# Patient Record
Sex: FEMALE | Race: WHITE | Employment: PART TIME | URBAN - METROPOLITAN AREA
[De-identification: names, ages, dates, MRNs, and addresses within clinical notes are randomized per-mention and may not be internally consistent; named-entity substitution may affect disease eponyms.]

---

## 2018-02-20 ENCOUNTER — PRE VISIT (OUTPATIENT)
Dept: MATERNAL FETAL MEDICINE | Facility: CLINIC | Age: 43
End: 2018-02-20

## 2018-02-21 ENCOUNTER — HOSPITAL ENCOUNTER (OUTPATIENT)
Dept: CARDIOLOGY | Facility: CLINIC | Age: 43
Discharge: HOME | End: 2018-02-21
Admitting: FAMILY MEDICINE
Payer: COMMERCIAL

## 2018-02-21 DIAGNOSIS — O26.90 PREGNANCY RELATED CONDITION, ANTEPARTUM: ICD-10-CM

## 2018-02-21 PROCEDURE — 93325 DOPPLER ECHO COLOR FLOW MAPG: CPT

## 2018-04-24 ENCOUNTER — HOSPITAL ENCOUNTER (OUTPATIENT)
Facility: CLINIC | Age: 43
Discharge: HOME | End: 2018-04-25
Attending: OBSTETRICS & GYNECOLOGY | Admitting: OBSTETRICS & GYNECOLOGY
Payer: COMMERCIAL

## 2018-04-24 ENCOUNTER — TRANSFERRED RECORDS (OUTPATIENT)
Dept: HEALTH INFORMATION MANAGEMENT | Facility: CLINIC | Age: 43
End: 2018-04-24

## 2018-04-24 PROBLEM — Z36.89 ENCOUNTER FOR TRIAGE IN PREGNANT PATIENT: Status: ACTIVE | Noted: 2018-04-24

## 2018-04-24 LAB
ABO + RH BLD: NORMAL
ABO + RH BLD: NORMAL
ALBUMIN UR-MCNC: NEGATIVE MG/DL
AMPHETAMINES UR QL SCN: NEGATIVE
APPEARANCE UR: CLEAR
BACTERIA #/AREA URNS HPF: ABNORMAL /HPF
BILIRUB UR QL STRIP: NEGATIVE
BLD GP AB SCN SERPL QL: NORMAL
BLOOD BANK CMNT PATIENT-IMP: NORMAL
CANNABINOIDS UR QL: NEGATIVE
COCAINE UR QL: NEGATIVE
COLOR UR AUTO: ABNORMAL
ERYTHROCYTE [DISTWIDTH] IN BLOOD BY AUTOMATED COUNT: 13 % (ref 10–15)
GLUCOSE UR STRIP-MCNC: NEGATIVE MG/DL
HCT VFR BLD AUTO: 32.9 % (ref 35–47)
HGB BLD-MCNC: 10.4 G/DL (ref 11.7–15.7)
HGB UR QL STRIP: ABNORMAL
KETONES UR STRIP-MCNC: 10 MG/DL
LEUKOCYTE ESTERASE UR QL STRIP: ABNORMAL
MCH RBC QN AUTO: 26.7 PG (ref 26.5–33)
MCHC RBC AUTO-ENTMCNC: 31.6 G/DL (ref 31.5–36.5)
MCV RBC AUTO: 85 FL (ref 78–100)
MUCOUS THREADS #/AREA URNS LPF: PRESENT /LPF
NITRATE UR QL: NEGATIVE
OPIATES UR QL SCN: NEGATIVE
PCP UR QL SCN: NEGATIVE
PH UR STRIP: 7.5 PH (ref 5–7)
PLATELET # BLD AUTO: 201 10E9/L (ref 150–450)
RBC # BLD AUTO: 3.89 10E12/L (ref 3.8–5.2)
RBC #/AREA URNS AUTO: 16 /HPF (ref 0–2)
SOURCE: ABNORMAL
SP GR UR STRIP: 1 (ref 1–1.03)
SPECIMEN EXP DATE BLD: NORMAL
SPECIMEN SOURCE: NORMAL
SQUAMOUS #/AREA URNS AUTO: 2 /HPF (ref 0–1)
UROBILINOGEN UR STRIP-MCNC: NORMAL MG/DL (ref 0–2)
WBC # BLD AUTO: 14 10E9/L (ref 4–11)
WBC #/AREA URNS AUTO: 5 /HPF (ref 0–5)
WET PREP SPEC: NORMAL

## 2018-04-24 PROCEDURE — 86900 BLOOD TYPING SEROLOGIC ABO: CPT | Performed by: OBSTETRICS & GYNECOLOGY

## 2018-04-24 PROCEDURE — 87086 URINE CULTURE/COLONY COUNT: CPT | Performed by: OBSTETRICS & GYNECOLOGY

## 2018-04-24 PROCEDURE — 81001 URINALYSIS AUTO W/SCOPE: CPT | Performed by: OBSTETRICS & GYNECOLOGY

## 2018-04-24 PROCEDURE — 86901 BLOOD TYPING SEROLOGIC RH(D): CPT | Performed by: OBSTETRICS & GYNECOLOGY

## 2018-04-24 PROCEDURE — 36415 COLL VENOUS BLD VENIPUNCTURE: CPT | Performed by: OBSTETRICS & GYNECOLOGY

## 2018-04-24 PROCEDURE — 87210 SMEAR WET MOUNT SALINE/INK: CPT | Performed by: OBSTETRICS & GYNECOLOGY

## 2018-04-24 PROCEDURE — 87491 CHLMYD TRACH DNA AMP PROBE: CPT | Performed by: OBSTETRICS & GYNECOLOGY

## 2018-04-24 PROCEDURE — 80307 DRUG TEST PRSMV CHEM ANLYZR: CPT | Performed by: OBSTETRICS & GYNECOLOGY

## 2018-04-24 PROCEDURE — 87591 N.GONORRHOEAE DNA AMP PROB: CPT | Performed by: OBSTETRICS & GYNECOLOGY

## 2018-04-24 PROCEDURE — 85027 COMPLETE CBC AUTOMATED: CPT | Performed by: OBSTETRICS & GYNECOLOGY

## 2018-04-24 PROCEDURE — 86850 RBC ANTIBODY SCREEN: CPT | Performed by: OBSTETRICS & GYNECOLOGY

## 2018-04-24 RX ORDER — PRENATAL VIT/IRON FUM/FOLIC AC 27MG-0.8MG
1 TABLET ORAL DAILY
Status: DISCONTINUED | OUTPATIENT
Start: 2018-04-24 | End: 2018-04-25 | Stop reason: HOSPADM

## 2018-04-24 RX ORDER — ONDANSETRON 2 MG/ML
4 INJECTION INTRAMUSCULAR; INTRAVENOUS EVERY 6 HOURS PRN
Status: DISCONTINUED | OUTPATIENT
Start: 2018-04-24 | End: 2018-04-25 | Stop reason: HOSPADM

## 2018-04-24 RX ORDER — SIMETHICONE 80 MG
160 TABLET,CHEWABLE ORAL EVERY 4 HOURS PRN
Status: DISCONTINUED | OUTPATIENT
Start: 2018-04-24 | End: 2018-04-25 | Stop reason: HOSPADM

## 2018-04-24 RX ORDER — PRENATAL VIT/IRON FUM/FOLIC AC 27MG-0.8MG
1 TABLET ORAL DAILY
COMMUNITY

## 2018-04-24 RX ORDER — AMOXICILLIN 250 MG
1 CAPSULE ORAL 2 TIMES DAILY
Status: DISCONTINUED | OUTPATIENT
Start: 2018-04-24 | End: 2018-04-25 | Stop reason: HOSPADM

## 2018-04-24 RX ORDER — BETAMETHASONE SODIUM PHOSPHATE AND BETAMETHASONE ACETATE 3; 3 MG/ML; MG/ML
12 INJECTION, SUSPENSION INTRA-ARTICULAR; INTRALESIONAL; INTRAMUSCULAR; SOFT TISSUE EVERY 24 HOURS
Status: COMPLETED | OUTPATIENT
Start: 2018-04-25 | End: 2018-04-25

## 2018-04-24 RX ORDER — AMOXICILLIN 250 MG
2 CAPSULE ORAL 2 TIMES DAILY
Status: DISCONTINUED | OUTPATIENT
Start: 2018-04-24 | End: 2018-04-25 | Stop reason: HOSPADM

## 2018-04-24 NOTE — PLAN OF CARE
Data: Patient admitted to room triage 1. Patient is a . Prenatal record reviewed.   Obstetric History       T5      L5     SAB0   TAB0   Ectopic0   Multiple0   Live Births5       # Outcome Date GA Lbr Emmanuel/2nd Weight Sex Delivery Anes PTL Lv   6 Current            5 Term         AYSE   4 Term         AYSE   3 Term         AYSE   2 Term         AYSE   1 Term         AYSE      Transferred from [facility] for rule out ROM and PTL eval. Negative amisure at [facility]. There she received Betamethasone , terbutaline and IV hydration. Transverse lie per US there also. States that she has a small amount of dark brown vaginal discharge. Is having contractions but not feeling them. FHT's 140's with minimal variability. VSS. Up all night and states is very tired.  Medical History:   Past Medical History:   Diagnosis Date     ASCUS favoring benign 4/10/15    neg HPV     Gestational age 34w2d. Vital signs per doc flowsheet. Fetal movement present. Patient reports No chief complaint on file.   as reason for admission. Support persons Daren and 2year old daughterpresent.  Action: Verbal consent for EFM, external fetal monitors applied. Admission assessment completed. Patient instructed to report change in fetal movement, contractions, vaginal leaking of fluid or bleeding, abdominal pain, or any concerns related to the pregnancy to her nurse/physician. Patient oriented to room, call light in reach.   Response: Dr. Denson informed of arrival. Plan per provider is EFM and US.

## 2018-04-24 NOTE — IP AVS SNAPSHOT
[facility]    [address]    [place] [address]    Phone:  [phone]                                       After Visit Summary   4/24/2018    Echo Lea    MRN: [id number]           After Visit Summary Signature Page     I have received my discharge instructions, and my questions have been answered. I have discussed any challenges I see with this plan with the nurse or doctor.    ..........................................................................................................................................  Patient/Patient Representative Signature      ..........................................................................................................................................  Patient Representative Print Name and Relationship to Patient    ..................................................               ................................................  Date                                            Time    ..........................................................................................................................................  Reviewed by Signature/Title    ...................................................              ..............................................  Date                                                            Time

## 2018-04-24 NOTE — H&P
"[facility] Labor and Delivery History and Physical    Echo Lea   MRN# [id number]     43 year old   YOB: 1975       Date of Admission:  2018     Primary care provider: John Cobb    CC: vaginal bleeding    HPI: Echo Lea  is a 43 year old  at 34w2d by 22w2d US who presents via transfer from [place] for evaluation of vaginal bleeding. She notes good fetal movement. The patient notes she woke up around 0300 this morning and went to the bathroom where she noticed a small amount of blood in the toilet and on the toilet paper, thought it may have been her \"mucus plug\". She states the blood wasn't bright and looked like, \"when you cut your finger.\" She reports she went back to bed and that when she laid down she thought she felt leaking fluid. The patient reports she went to the bathroom and again noted bright red spotting and a corrie-sized clot in the toilet, at which time she went to the hospital. She said she was initially having contractions every 3 minutes that, \"felt like bearing down,\" but were not painful. Ms. Lea states she has been feeling something similar intermittently over the past month. After receiving shots at [place] she notes these episodes decreased and she now feels like she is not having contractions but this feeling of bearing down and tightness across her lower abdomen. Has felt this is related to FM in the past. The patient notes that she had intercourse last night. She states she has had an uncomplicated pregnancy but notes restless legs and back pain that has worsened during the pregnancy.     Echo's pregnancy is complicated by suspected fetal Trisomy 21 by ultrasound findings (multiple VSD, thick nuchal fold, sandal gap), as well as Innatal positive for Trisomy 21.    Ms. Lea notes some urgency and frequency, but denies dysuria, HA, vision changes, chest pain, SOB, or swelling in her extremities.     Pregnancy " "complicated by:  1. AMA  2. Trisomy 21 of fetus, based on US markers, with VSD x3, positive NIPT  3. Polyhydramnios (PHYLLIS 28) diagnosed on 4/10 US  4. Hx of GDM, diet controlled    ROS:   Complete 10-point ROS negative except as noted in HPI.     Pregnancy History:  OBSTETRIC HISTORY:    Patient notes history of two early term miscarriages and that she has had successful pregnancies since that time. She reports normal vaginal deliveries at term for her other 5 children.     Prenatal Labs (18):  Blood type: O   Rh: positive  Antibody screen: negative  Hgb: 13.2 g/dL  Plt: 265  HIV: negative  HBsAg: negative  CBC: HGB, Plt  Rubella: Immune  VDRL: negative  GC/Chlam: No results available  GCT: No results available    GBS Status:   Results from [place] pending    Medication Prior to Admission  PNV     Medical History  ASCUS pap, neg HPV    Surgical History  Denies    Family History  No family hx of bleeding or clotting disorders    Social History:  Lives with partner and daugter. Denies tobacco, ETOH or drug use during pregnancy.    Physical Exam:  /58  Temp 98.2  F (36.8  C)  Resp 16  Ht 1.651 m (5' 5\")  Wt 69.4 kg (153 lb)  LMP 2017  BMI 25.46 kg/m2     Gen: Appears tired but in no apparent distress.  CV: RRR, no murmurs  Pulm: CTAB, no wheezes   GI: gravid, soft, nontender.   : golf ball-sized dark red clot in the vaginal vault with small amount of dark liquid blood.   Cervix: soft, multiparous    Presentation:Transverse, back up by US at OSH    Fetal Heart Rate Tracing: Baseline 140s, moderate variability, accels present, no decels  Tocometer: 1-2 contractions in 10 minutes    US: 4/10/18: Impression  =========    1) Intrauterine pregnancy at 32 3/7 weeks gestational age.  2) Known VSDs, however not visualized today.  3) The cisterna magna and CNS appeared normal today.  4) None of the other anomalies commonly detected by ultrasound were evident in the fetal anatomic survey " described above.  5) Growth parameters and estimated fetal weight were consistent with an appropriate for gestation age pattern of growth.  6) New onset polyhydramnios.  7) Reassuring BPP.    Assessment:   Echo Lea  is a 43 year old  at 34w2d by 22w2d US who presents via transfer from [place] for evaluation of vaginal bleeding. Patient's pregnancy significant for AMA, suspected trisomy 21 of fetus with VSD x3, polyhydramnios, and hx of GDM.  On exam a small to moderate appearing amount of dark red blood in vault. Exam overall reassuring with cervix /-2 soft, MP. Patient in NAD and FHT Cat 1, reactive.  Source of bleeding unclear, did have intercourse last night but more bleeding than typically seen with cervical friability. No identified trauma that would increase risk for abruption. Contractions present, so possible that cervical change with  labor is the source of vaginal bleeding. We will plan to admit overnight for observation and fetal monitoring to rule out abruption.     Plan:  #Vaginal bleeding  - Source unclear. Recent intercourse but more bleeding than typical for this. Plan to admit to to observation to help rule out abruption.   - Less concerned for  labor given unchanged on cervical exam, will continue to monitor contractions and recheck PRN  - GC/Chlam and wet prep pending to rule out cervicitis   - UDS pending  - Plan for BPP and evaluation of placenta and fetal growth tomorrow  - S/p BMZ this morning prior to transfer, will repeat in AM  - CBC and T&S pending  - No tocolysis given gestational age beyond 34 weeks    #Polyhydramnios  - Weekly BPP (plan to perform tomorrow am)    #FWB  - Cat I reactive  - T21 with known VSD, plan for fetal echo immediately PP  - Continue to monitor closely for signs of abrutpion    #PNC  - GBS pending from OSH  - Transverse back up at OSH, plan for CS if laboring, but will reconfirm fetal position prior to delivery.     # Pain  Assessment:  Current Pain Score 4/24/2018   Patient currently in pain? yes   Pain location Back   Pain descriptors Aching   - Echo is experiencing pain due to pregnancy. Pain management was discussed and the plan was created in a collaborative fashion.  Echo's response to the current recommendations: engaged  Plan for tylenol PRN    Jazzmine Marquez MD PGY3  OB/GYN Resident  4/24/2018 11:44 AM

## 2018-04-24 NOTE — IP AVS SNAPSHOT
MRN:[id number]                      After Visit Summary   4/24/2018    Echo Lea    MRN: [id number]           Thank you!     Thank you for choosing [facility] for your care. Our goal is always to provide you with excellent care. Hearing back from our patients is one way we can continue to improve our services. Please take a few minutes to complete the written survey that you may receive in the mail after you visit with us. Thank you!        Patient Information     Date Of Birth          1975        Designated Caregiver       Most Recent Value    Caregiver    Will someone help with your care after discharge? no      About your hospital stay     You were admitted on:  April 24, 2018 You last received care in the:  [facility]    You were discharged on:  April 25, 2018        Reason for your hospital stay       Rule out labor, abruption                  Who to Call     For medical emergencies, please call 911.  For non-urgent questions about your medical care, please call your primary care provider or clinic, [phone]          Attending Provider     Provider Specialty    Carina Denson MD OB/Gyn       Primary Care Provider Office Phone # Fax #    John Cobb [phone]       When to contact your care team       Call increasing contractions, vaginal bleeding, decreased fetal movement, leaking fluid, headache, visoin changes, chest pain, shortness of breath                  After Care Instructions     Activity       Your activity upon discharge: as tolerated, no intercourse            Diet       Follow this diet upon discharge: regular                  Follow-up Appointments     Follow Up and recommended labs and tests       Follow up within one week for OB visit. Begin twice weekly BPPs.                  Further instructions from your care team       Discharge Instruction for Undelivered Patients      You were seen for: Labor Assessment and Bleeding Assessment  We  "Consulted: Maternal Fetal Medicine, Dr Denson  You had (Test or Medicine):BPP     Diet:   Drink 8 to 12 glasses of liquids (milk, juice, water) every day.  You may eat meals and snacks.     Activity:  Rest the pelvic area. No sex. Do not stimulate breasts or nipples.  Count fetal kicks everyday (see handout)  Call your doctor or nurse midwife if your baby is moving less than usual.     Call your provider if you notice:  Swelling in your face or increased swelling in your hands or legs.  Headaches that are not relieved by Tylenol (acetaminophen).  Changes in your vision (blurring: seeing spots or stars.)  Nausea (sick to your stomach) and vomiting (throwing up).   Weight gain of 5 pounds or more per week.  Heartburn that doesn't go away.  Signs of bladder infection: pain when you urinate (use the toilet), need to go more often and more urgently.  The bag of velez (rupture of membranes) breaks, or you notice leaking in your underwear.  Bright red blood in your underwear.  Abdominal (lower belly) or stomach pain.  Second (plus) baby: Contractions (tightening) less than 10 minutes apart and getting stronger.  If less than 34 weeks: Contractions (tightenings) more than 6 times in one hour.  Increase or change in vaginal discharge (note the color and amount)      Follow-up:  As scheduled in the clinic          Pending Results     Date and Time Order Name Status Description    4/25/2018 0007 Maternal Fetal BPP Single In process     4/24/2018 1027 Chlamydia trachomatis PCR In process     4/24/2018 1027 Neisseria gonorrhoea PCR In process     4/24/2018 0900 Urine Culture Aerobic Bacterial Preliminary             Admission Information     Date & Time Provider Department Dept. Phone    4/24/2018 Carina Denson MD [facility] [phone]      Your Vitals Were     Blood Pressure Temperature Respirations Height Weight Last Period    90/53 98.3  F (36.8  C) (Oral) 18 1.651 m (5' 5\") 69.4 kg (153 lb) 09/29/2017    BMI (Body " "Mass Index)                   25.46 kg/m2           imagine Information     imagine lets you send messages to your doctor, view your test results, renew your prescriptions, schedule appointments and more. To sign up, go to [email or web address] . Click on \"Log in\" on the left side of the screen, which will take you to the Welcome page. Then click on \"Sign up Now\" on the right side of the page.     You will be asked to enter the access code listed below, as well as some personal information. Please follow the directions to create your username and password.     Your access code is: [email or web address]  Expires: 2018 10:21 AM     Your access code will  in 90 days. If you need help or a new code, please call your [place] clinic or [phone].        Care EveryWhere ID     This is your Care EveryWhere ID. This could be used by other organizations to access your [place] medical records  [id number]        Equal Access to Services     ROBERT STROUD : Hadii sd sykes hadasho Sojoseali, waaxda luqadaha, qaybta kaalmada adeegyada, alfonso hartmann . So [facility] [phone].    ATENCIÓN: Si habla español, tiene a garcia disposición servicios gratuitos de asistencia lingüística. Sonido al [phone].    We comply with applicable federal civil rights laws and [place] laws. We do not discriminate on the basis of race, color, national origin, age, disability, sex, sexual orientation, or gender identity.               Review of your medicines      CONTINUE these medicines which have NOT CHANGED        Dose / Directions    prenatal multivitamin plus iron 27-0.8 MG Tabs per tablet        Dose:  1 tablet   Take 1 tablet by mouth daily   Refills:  0                Protect others around you: Learn how to safely use, store and throw away your medicines at www.disposemymeds.org.             Medication List: This is a list of all your medications and when to take them. Check marks below indicate your daily home " schedule. Keep this list as a reference.      Medications           Morning Afternoon Evening Bedtime As Needed    prenatal multivitamin plus iron 27-0.8 MG Tabs per tablet   Take 1 tablet by mouth daily                                          More Information        Kick Counts    It s normal to worry about your baby s health. One way you can know your baby s doing well is to record the baby s movements once a day. This is called a kick count. Remember to take your kick count records to all your appointments with your healthcare provider.  How to count kicks  Here are tips for counting kicks:    Choose a time when the baby is active, such as after a meal.     Sit comfortably or lie on your side.     The first time the baby moves, write down the time.     Count each movement until the baby has moved 10 times. This can take from 20 minutes to 2 hours.     Try to do it at the same time each day.  When to call your healthcare provider  Call your healthcare provider right away if you notice any of the following:    Your baby moves fewer than 10 times in 2 hours while you re doing kick counts.    Your baby moves much less often than on the days before.    You have not felt your baby move all day.  Date Last Reviewed: 12/1/2017 2000-2017 [employer]. [address] [place] [address], [place] [address]. All rights reserved. This information is not intended as a substitute for professional medical care. Always follow your healthcare professional's instructions.

## 2018-04-24 NOTE — PLAN OF CARE
"Problem: Bleeding, Second or Third Trimester (Adult,Obstetrics,Pediatric)  Goal: Signs and Symptoms of Listed Potential Problems Will be Absent, Minimized or Managed (Bleeding, Second or Third Trimester)  Signs and symptoms of listed potential problems will be absent, minimized or managed by discharge/transition of care (reference Bleeding, Second or Third Trimester (Adult,Obstetrics,Pediatric) CPG).   Outcome: No Change  Pt transferred to 420, settled and oriented to room. EFM and toco reapplied. MD Denson to bedside to discuss plan of care with pt.     VSS. Reports irregular contractions, scant \"pinkish-red\" bleeding, denies LOF. Continue to monitor per protocol.       "

## 2018-04-24 NOTE — DISCHARGE SUMMARY
"[facility] Discharge Summary    Echo Lea MRN# [id number]   Age: 43 year old YOB: 1975     Date of Admission:  2018  Date of Discharge::  2018  Admitting Physician:  Carina Denson MD  Discharge Physician:             Carina Denson MD       Admission Diagnosis:  IUP at 34w2d  AMA  SuspectedTrisomy 21 of fetus (positive NIPT with sonographic markers)  Fetal VSD x3  Polyhydramnios  Hx of GDM, diet controlled   contractions with bleeding  Grand multiparous    Discharge Diagnosis:  IUP at 34w3d  AMA  Suspected Trisomy of Fetus (positive NIPT with sonographic markers)  Fetal VSD x3  Severe Polyhydramnios  Hx of GDM, diet controlled   contractions with bleeding  Grand multiparous    Procedures:   BPP    Consultations:   None    Medications prior to admission:  PNV    Brief History of Presentation:   Echo Lea  is a 43 year old  at 34w2d by 22w2d US who presents via transfer from [place] for evaluation of vaginal bleeding. She notes good fetal movement. The patient notes she woke up around 0300 this morning and went to the bathroom where she noticed a small amount of blood in the toilet and on the toilet paper, thought it may have been her \"mucus plug\". She states the blood wasn't bright and looked like, \"when you cut your finger.\" She reports she went back to bed and that when she laid down she thought she felt leaking fluid. The patient reports she went to the bathroom and again noted bright red spotting and a corrie-sized clot in the toilet, at which time she went to the hospital. She said she was initially having contractions every 3 minutes that, \"felt like bearing down,\" but were not painful. Ms. Lea states she has been feeling something similar intermittently over the past month. After receiving shots at [place] she notes these episodes decreased and she now feels like she is not having contractions but this feeling of bearing down " and tightness across her lower abdomen. Has felt this is related to FM in the past. The patient notes that she had intercourse last night. She states she has had an uncomplicated pregnancy but notes restless legs and back pain that has worsened during the pregnancy.      Echo's pregnancy is complicated by suspected fetal Trisomy 21 by ultrasound findings (multiple VSD, thick nuchal fold, sandal gap), as well as Innatal positive for Trisomy 21.     Ms. Lea notes some urgency and frequency, but denies dysuria, HA, vision changes, chest pain, SOB, or swelling in her extremities    Hospital Course:    Patient was admitted to antepartum for continued observation and evaluation in the setting of vaginal bleeding with small to moderate amount of dark red blood visualized on speculum exam. She underwent fetal monitoring which was reactive and reassuring without evidence of fetal distress. Infectious work-up was negative with pending GC/Chlam at time of discharge. She was given one dose of BMZ prior to transfer and received her second on HD#2. Patient had been ani regularly on the monitor, however they remained non-painful and did not change in frequency. Cervical exam was done prior to discharge without signs of cervical change or concern for  labor. Abruption could not definitively be ruled out, however bleeding resolved spontaneously without recurrence overnight. Patient was counseled on preference for one additional day of observation, however patient had strong desire to leave and was unable to remain inpatient due to childcare concerns. She was clinically stable at discharge and repeat cervical exam unchanged on day of discharge. Of note prior to discharge she had BPP 10/10 but with increase in polyhydramnios to 40.2. Discussed indications for return and low threshold to return given distance from her home.     Discharge Instructions:  Call or present to labor and delivery if you  experience:   -Regular painful contractions concerning for labor   -Leakage of fluid concerning for ruptured membranes   -Decreased fetal movement   -Bright red vaginal bleeding    -Headache, vision changes, upper abdominal pain, significant increase in swelling,   generalized unwell feeling    Follow up:  Follow up in [facility] clinic on 5/2/2018 at [place] as previously scheduled  Follow up with Kellie Ob/Gyn MD within one week of hospital discharge  Continue weekly BPPs given polyhydramnios    Discharge Medications:  PNV    Jazzmine Marquez PGY3  4/25/2018 11:37 AM     Physician Attestation   I, Carina Denson, saw and evaluated this patient prior to discharge.  I discussed the patient with the resident and agree with plan of care as documented in the resident note.      I personally reviewed vital signs, medications, labs and imaging.    I personally spent 30 minutes on discharge activities.    Carina Denson  Date of Service (when I saw the patient): 04/25/18

## 2018-04-25 ENCOUNTER — HOSPITAL ENCOUNTER (OUTPATIENT)
Dept: ULTRASOUND IMAGING | Facility: CLINIC | Age: 43
End: 2018-04-25
Attending: OBSTETRICS & GYNECOLOGY
Payer: COMMERCIAL

## 2018-04-25 VITALS
TEMPERATURE: 98.3 F | HEIGHT: 65 IN | BODY MASS INDEX: 25.49 KG/M2 | WEIGHT: 153 LBS | RESPIRATION RATE: 18 BRPM | SYSTOLIC BLOOD PRESSURE: 90 MMHG | DIASTOLIC BLOOD PRESSURE: 53 MMHG

## 2018-04-25 LAB
BACTERIA SPEC CULT: NORMAL
Lab: NORMAL
SPECIMEN SOURCE: NORMAL

## 2018-04-25 PROCEDURE — 25000128 H RX IP 250 OP 636: Performed by: OBSTETRICS & GYNECOLOGY

## 2018-04-25 PROCEDURE — 76819 FETAL BIOPHYS PROFIL W/O NST: CPT

## 2018-04-25 RX ADMIN — BETAMETHASONE SODIUM PHOSPHATE AND BETAMETHASONE ACETATE 12 MG: 3; 3 INJECTION, SUSPENSION INTRA-ARTICULAR; INTRALESIONAL; INTRAMUSCULAR at 05:54

## 2018-04-25 NOTE — PLAN OF CARE
"Problem: Patient Care Overview  Goal: Plan of Care/Patient Progress Review  Outcome: Improving  Pt stable, VS WDL. Pt c/o lower back pain, using ice pack for pain relief. Pt c/o headache in AM, declined interventions. Pt denies leaking of fluid; states \"small blood clot\" this AM but otherwise no bleeding. 2nd dose of betamethasone given at 0555. FHR normal baseline, moderate variabilitly, accels present, no decels. Fabio q 4-10 min, pt denies feeling contractions, states feeling sharp back pains when sitting in bed, finds relief laying on side.      "

## 2018-04-25 NOTE — PROGRESS NOTES
"OB ANTEPARTUM PROGRESS NOTE      Subjective: Patient doing okay. Strongly desires discharge to home. No increase in contractions overnight, just describes an intermittent \"bearing down.\" Complains of significant back pain. Small dark red clot this morning, otherwise no further bleeding. Feeling baby move.     Objective:  BP 90/53  Temp 98.3  F (36.8  C) (Oral)  Resp 18  Ht 1.651 m (5' 5\")  Wt 69.4 kg (153 lb)  LMP 2017  BMI 25.46 kg/m2   Gen: Lying in bed, NAD  Heart/Lung: no increased work of breathing, well perfused  Abd: soft, non tender  Ext: trace edema, non tender  SVE: /-2    Imaging:  BPP: PHYLLIS 40.2, 10/10 points scored  FHT: 140 baseline, moderate variability, + accels, no decels  [place]: 1-2 ctx/10 min    Assessment and Plan:  Echo Lea  is a 43 year old  at 34w3d by 22w2d US who was admitted yesterday after transfer from [place] in the setting of larger than expected vaginal bleeding and contractions. Patient's pregnancy significant for AMA, suspected trisomy 21 of fetus with VSD x3, polyhydramnios, and hx of GDM.  On exam a small to moderate appearing amount of dark red blood in vault on admission. Patient has been stable overnight with no increasing contractions and no further vaginal bleeding. SVE this morning is unchanged and patient strongly desires discharge to home (unable to stay due to childcare constraints).      Plan:  #Vaginal bleeding  - Source unclear. Recent intercourse but more bleeding than typical for this. has been admitted for further evaluation and has had no further active bleeding.   - Less concerned for  labor given unchanged on cervical exam again this morning   - GC/Chlam pending to rule out cervicitis. Wet prep WNL.   - UDS neg  - BPP 10/10 this morning  - s/p BMZ x2  - HGB stable, no ongoing findings to suggest abruption. Did discuss recommendation for further monitoring overnight -- patient declines 2/2  issues.  - No tocolysis " given gestational age beyond 34 weeks     #Polyhydramnios  - Worsened this morning to 40.2  - Discussed recommendation to continue weekly BPPs  - Consider delivery at 37 weeks if persistent severe polyhdyramnios  - No evidence for GI abnormality on US (no evidence for duodenal atresia or TEF), although ultrasound cannot definitively rule out GI anomalies     #FWB  - Cat I reactive, 10/10 BPP  - T21 with known VSD, plan for fetal echo 24-48 hours after delivery    #PNC  - GBS pending from OSH  - Vertex by US today  - Next BPP scheduled at [facility] clinic at  on 5/2/2018    Jazzmine Marquez MD  OB GYN PGY-3  4/25/2018 11:32 AM     Physician Attestation   I, Carina Denson, saw and evaluated this patient prior to discharge.  I discussed the patient with the resident and agree with plan of care as documented in the resident note.      I personally reviewed vital signs, medications, labs and imaging.    I personally spent 30 minutes on discharge activities.    Carina Denson  Date of Service (when I saw the patient): 04/25/18

## 2018-04-25 NOTE — DISCHARGE INSTRUCTIONS
Discharge Instruction for Undelivered Patients      You were seen for: Labor Assessment and Bleeding Assessment  We Consulted: Maternal Fetal Medicine, Dr Denson  You had (Test or Medicine):BPP     Diet:   Drink 8 to 12 glasses of liquids (milk, juice, water) every day.  You may eat meals and snacks.     Activity:  Rest the pelvic area. No sex. Do not stimulate breasts or nipples.  Count fetal kicks everyday (see handout)  Call your doctor or nurse midwife if your baby is moving less than usual.     Call your provider if you notice:  Swelling in your face or increased swelling in your hands or legs.  Headaches that are not relieved by Tylenol (acetaminophen).  Changes in your vision (blurring: seeing spots or stars.)  Nausea (sick to your stomach) and vomiting (throwing up).   Weight gain of 5 pounds or more per week.  Heartburn that doesn't go away.  Signs of bladder infection: pain when you urinate (use the toilet), need to go more often and more urgently.  The bag of velez (rupture of membranes) breaks, or you notice leaking in your underwear.  Bright red blood in your underwear.  Abdominal (lower belly) or stomach pain.  Second (plus) baby: Contractions (tightening) less than 10 minutes apart and getting stronger.  If less than 34 weeks: Contractions (tightenings) more than 6 times in one hour.  Increase or change in vaginal discharge (note the color and amount)      Follow-up:  As scheduled in the clinic

## 2018-04-25 NOTE — PROVIDER NOTIFICATION
04/24/18 222   Provider Notification   Provider Name/Title Dr. PAYAL Perkins (G3)   Method of Notification Electronic Page   Request Evaluate - Remote   Notification Reason Other (Comment)   Paged Dr. Perkins to discuss d/c continuous monitoring on patient. Patient has not been feeling contractions and contractions have only felt like mild tightening per patient. Dr. Perkins agreed that she could be off continuous monitoring and just have monitoring done Qshift.

## 2018-04-25 NOTE — PLAN OF CARE
"Problem: Patient Care Overview  Goal: Plan of Care/Patient Progress Review  Outcome: Improving  VSS. Afebrile. Patient denies LOF, cramping, pelvic pressure, vision changes, epigastric pain, headache, edema and cramping. Patient states she feels occasional \"tightening\" but nothing painful. She is experiencing low back pain. She states she has a pinched nerve which has been causing her to have low back pain for a while. Reports good FM. Was having scant pink vaginal discharge earlier this evening but now reports bleeding has stopped. FHT WNL for gestational age. Continue to monitor for  labor signs and symptoms.       "

## 2018-04-25 NOTE — PLAN OF CARE
Problem: Patient Care Overview  Goal: Plan of Care/Patient Progress Review  Outcome: Improving  Patient denies bleeding since yesterday. Denies LOF.Patient denies cramping, UTI symptoms, GI problems, edema, headache, visual disturbances, epigastric or URQ pain, abdominal pain, rupture of membranes. Support persons  present.     Reports chronic hip pain and nerve pain making it very difficult for her to remain in the bed or on her back to be monitored. 34w3d. VSS. Cervix: unchanged since last check (1/30/-2) checked by Dr Marquez.  Fetal movement present.      Dr. Denson and Dr Marquez at bedside. Pt and  strongly desire to discharge. Plan per provider is to discharge pt. Patient verbalized understanding of discharge education and verbalized agreement with plan, also given fetal kick count education. Discharged ambulatory.

## 2018-04-27 LAB
C TRACH DNA SPEC QL NAA+PROBE: NEGATIVE
N GONORRHOEA DNA SPEC QL NAA+PROBE: NEGATIVE
SPECIMEN SOURCE: NORMAL
SPECIMEN SOURCE: NORMAL

## 2018-04-29 ENCOUNTER — ANESTHESIA EVENT (OUTPATIENT)
Dept: OBGYN | Facility: CLINIC | Age: 43
End: 2018-04-29
Payer: COMMERCIAL

## 2018-04-29 ENCOUNTER — SURGERY (OUTPATIENT)
Age: 43
End: 2018-04-29

## 2018-04-29 ENCOUNTER — ANESTHESIA (OUTPATIENT)
Dept: OBGYN | Facility: CLINIC | Age: 43
End: 2018-04-29
Payer: COMMERCIAL

## 2018-04-29 ENCOUNTER — HOSPITAL ENCOUNTER (INPATIENT)
Facility: CLINIC | Age: 43
LOS: 4 days | Discharge: HOME HEALTH | End: 2018-05-03
Attending: OBSTETRICS & GYNECOLOGY | Admitting: OBSTETRICS & GYNECOLOGY
Payer: COMMERCIAL

## 2018-04-29 DIAGNOSIS — O45.93 PLACENTAL ABRUPTION IN THIRD TRIMESTER: Primary | ICD-10-CM

## 2018-04-29 DIAGNOSIS — D62 ANEMIA DUE TO BLOOD LOSS, ACUTE: ICD-10-CM

## 2018-04-29 DIAGNOSIS — Z98.891 S/P C-SECTION: ICD-10-CM

## 2018-04-29 LAB
ABO + RH BLD: NORMAL
ABO + RH BLD: NORMAL
BASOPHILS # BLD AUTO: 0 10E9/L (ref 0–0.2)
BASOPHILS NFR BLD AUTO: 0.2 %
BLD GP AB SCN SERPL QL: NORMAL
BLOOD BANK CMNT PATIENT-IMP: NORMAL
DIFFERENTIAL METHOD BLD: ABNORMAL
EOSINOPHIL # BLD AUTO: 0.1 10E9/L (ref 0–0.7)
EOSINOPHIL NFR BLD AUTO: 0.5 %
ERYTHROCYTE [DISTWIDTH] IN BLOOD BY AUTOMATED COUNT: 13.2 % (ref 10–15)
HCT VFR BLD AUTO: 33.3 % (ref 35–47)
HGB BLD-MCNC: 10.7 G/DL (ref 11.7–15.7)
IMM GRANULOCYTES # BLD: 0.1 10E9/L (ref 0–0.4)
IMM GRANULOCYTES NFR BLD: 1 %
LYMPHOCYTES # BLD AUTO: 2 10E9/L (ref 0.8–5.3)
LYMPHOCYTES NFR BLD AUTO: 16.6 %
MCH RBC QN AUTO: 26.9 PG (ref 26.5–33)
MCHC RBC AUTO-ENTMCNC: 32.1 G/DL (ref 31.5–36.5)
MCV RBC AUTO: 84 FL (ref 78–100)
MONOCYTES # BLD AUTO: 0.7 10E9/L (ref 0–1.3)
MONOCYTES NFR BLD AUTO: 5.6 %
NEUTROPHILS # BLD AUTO: 9.1 10E9/L (ref 1.6–8.3)
NEUTROPHILS NFR BLD AUTO: 76.1 %
NRBC # BLD AUTO: 0 10*3/UL
NRBC BLD AUTO-RTO: 0 /100
PLATELET # BLD AUTO: 186 10E9/L (ref 150–450)
RBC # BLD AUTO: 3.98 10E12/L (ref 3.8–5.2)
SPECIMEN EXP DATE BLD: NORMAL
WBC # BLD AUTO: 12 10E9/L (ref 4–11)

## 2018-04-29 PROCEDURE — 25000128 H RX IP 250 OP 636: Performed by: ANESTHESIOLOGY

## 2018-04-29 PROCEDURE — 86850 RBC ANTIBODY SCREEN: CPT | Performed by: OBSTETRICS & GYNECOLOGY

## 2018-04-29 PROCEDURE — 86900 BLOOD TYPING SEROLOGIC ABO: CPT | Performed by: OBSTETRICS & GYNECOLOGY

## 2018-04-29 PROCEDURE — 25000125 ZZHC RX 250: Performed by: NURSE ANESTHETIST, CERTIFIED REGISTERED

## 2018-04-29 PROCEDURE — 27210794 ZZH OR GENERAL SUPPLY STERILE: Performed by: OBSTETRICS & GYNECOLOGY

## 2018-04-29 PROCEDURE — 88307 TISSUE EXAM BY PATHOLOGIST: CPT | Performed by: INTERNAL MEDICINE

## 2018-04-29 PROCEDURE — 25000128 H RX IP 250 OP 636: Performed by: NURSE ANESTHETIST, CERTIFIED REGISTERED

## 2018-04-29 PROCEDURE — 36415 COLL VENOUS BLD VENIPUNCTURE: CPT | Performed by: OBSTETRICS & GYNECOLOGY

## 2018-04-29 PROCEDURE — 71000014 ZZH RECOVERY PHASE 1 LEVEL 2 FIRST HR: Performed by: OBSTETRICS & GYNECOLOGY

## 2018-04-29 PROCEDURE — 36000057 ZZH SURGERY LEVEL 3 1ST 30 MIN - UMMC: Performed by: OBSTETRICS & GYNECOLOGY

## 2018-04-29 PROCEDURE — 37000009 ZZH ANESTHESIA TECHNICAL FEE, EACH ADDTL 15 MIN: Performed by: OBSTETRICS & GYNECOLOGY

## 2018-04-29 PROCEDURE — 36000059 ZZH SURGERY LEVEL 3 EA 15 ADDTL MIN UMMC: Performed by: OBSTETRICS & GYNECOLOGY

## 2018-04-29 PROCEDURE — 25000132 ZZH RX MED GY IP 250 OP 250 PS 637

## 2018-04-29 PROCEDURE — 25000128 H RX IP 250 OP 636: Performed by: STUDENT IN AN ORGANIZED HEALTH CARE EDUCATION/TRAINING PROGRAM

## 2018-04-29 PROCEDURE — 86780 TREPONEMA PALLIDUM: CPT | Performed by: OBSTETRICS & GYNECOLOGY

## 2018-04-29 PROCEDURE — 25000128 H RX IP 250 OP 636: Performed by: INTERNAL MEDICINE

## 2018-04-29 PROCEDURE — 40000977 ZZH STATISTIC ATTENDANCE AT DELIVERY

## 2018-04-29 PROCEDURE — 37000008 ZZH ANESTHESIA TECHNICAL FEE, 1ST 30 MIN: Performed by: OBSTETRICS & GYNECOLOGY

## 2018-04-29 PROCEDURE — 71000015 ZZH RECOVERY PHASE 1 LEVEL 2 EA ADDTL HR: Performed by: OBSTETRICS & GYNECOLOGY

## 2018-04-29 PROCEDURE — 27110028 ZZH OR GENERAL SUPPLY NON-STERILE: Performed by: OBSTETRICS & GYNECOLOGY

## 2018-04-29 PROCEDURE — 85025 COMPLETE CBC W/AUTO DIFF WBC: CPT | Performed by: OBSTETRICS & GYNECOLOGY

## 2018-04-29 PROCEDURE — 40000170 ZZH STATISTIC PRE-PROCEDURE ASSESSMENT II: Performed by: OBSTETRICS & GYNECOLOGY

## 2018-04-29 PROCEDURE — 88307 TISSUE EXAM BY PATHOLOGIST: CPT | Mod: 26 | Performed by: INTERNAL MEDICINE

## 2018-04-29 PROCEDURE — 25000132 ZZH RX MED GY IP 250 OP 250 PS 637: Performed by: INTERNAL MEDICINE

## 2018-04-29 PROCEDURE — 12000030 ZZH R&B OB INTERMEDIATE UMMC

## 2018-04-29 PROCEDURE — 86901 BLOOD TYPING SEROLOGIC RH(D): CPT | Performed by: OBSTETRICS & GYNECOLOGY

## 2018-04-29 PROCEDURE — C9290 INJ, BUPIVACAINE LIPOSOME: HCPCS | Performed by: ANESTHESIOLOGY

## 2018-04-29 PROCEDURE — 71000027 ZZH RECOVERY PHASE 2 EACH 15 MINS: Performed by: OBSTETRICS & GYNECOLOGY

## 2018-04-29 PROCEDURE — 25000125 ZZHC RX 250: Performed by: INTERNAL MEDICINE

## 2018-04-29 RX ORDER — SIMETHICONE 80 MG
80 TABLET,CHEWABLE ORAL 4 TIMES DAILY PRN
Status: DISCONTINUED | OUTPATIENT
Start: 2018-04-29 | End: 2018-05-03 | Stop reason: HOSPADM

## 2018-04-29 RX ORDER — ONDANSETRON 2 MG/ML
4 INJECTION INTRAMUSCULAR; INTRAVENOUS EVERY 30 MIN PRN
Status: DISCONTINUED | OUTPATIENT
Start: 2018-04-29 | End: 2018-04-29 | Stop reason: HOSPADM

## 2018-04-29 RX ORDER — FENTANYL CITRATE 50 UG/ML
25-50 INJECTION, SOLUTION INTRAMUSCULAR; INTRAVENOUS
Status: DISCONTINUED | OUTPATIENT
Start: 2018-04-29 | End: 2018-04-29 | Stop reason: HOSPADM

## 2018-04-29 RX ORDER — ONDANSETRON 4 MG/1
4 TABLET, ORALLY DISINTEGRATING ORAL EVERY 30 MIN PRN
Status: CANCELLED | OUTPATIENT
Start: 2018-04-29

## 2018-04-29 RX ORDER — DIPHENHYDRAMINE HYDROCHLORIDE 50 MG/ML
25 INJECTION INTRAMUSCULAR; INTRAVENOUS EVERY 6 HOURS PRN
Status: DISCONTINUED | OUTPATIENT
Start: 2018-04-29 | End: 2018-05-03 | Stop reason: HOSPADM

## 2018-04-29 RX ORDER — OXYTOCIN 10 [USP'U]/ML
10 INJECTION, SOLUTION INTRAMUSCULAR; INTRAVENOUS
Status: DISCONTINUED | OUTPATIENT
Start: 2018-04-29 | End: 2018-05-03 | Stop reason: HOSPADM

## 2018-04-29 RX ORDER — NALOXONE HYDROCHLORIDE 0.4 MG/ML
.1-.4 INJECTION, SOLUTION INTRAMUSCULAR; INTRAVENOUS; SUBCUTANEOUS
Status: ACTIVE | OUTPATIENT
Start: 2018-04-29 | End: 2018-04-30

## 2018-04-29 RX ORDER — ONDANSETRON 2 MG/ML
INJECTION INTRAMUSCULAR; INTRAVENOUS PRN
Status: DISCONTINUED | OUTPATIENT
Start: 2018-04-29 | End: 2018-04-29

## 2018-04-29 RX ORDER — ONDANSETRON 2 MG/ML
4 INJECTION INTRAMUSCULAR; INTRAVENOUS EVERY 30 MIN PRN
Status: CANCELLED | OUTPATIENT
Start: 2018-04-29

## 2018-04-29 RX ORDER — CEFAZOLIN SODIUM 2 G/100ML
2 INJECTION, SOLUTION INTRAVENOUS
Status: DISCONTINUED | OUTPATIENT
Start: 2018-04-29 | End: 2018-04-29 | Stop reason: HOSPADM

## 2018-04-29 RX ORDER — IBUPROFEN 600 MG/1
600 TABLET, FILM COATED ORAL EVERY 6 HOURS PRN
Status: DISCONTINUED | OUTPATIENT
Start: 2018-04-29 | End: 2018-05-03 | Stop reason: HOSPADM

## 2018-04-29 RX ORDER — ONDANSETRON 2 MG/ML
4 INJECTION INTRAMUSCULAR; INTRAVENOUS EVERY 6 HOURS PRN
Status: DISCONTINUED | OUTPATIENT
Start: 2018-04-29 | End: 2018-05-03 | Stop reason: HOSPADM

## 2018-04-29 RX ORDER — HYDROMORPHONE HYDROCHLORIDE 1 MG/ML
.3-.5 INJECTION, SOLUTION INTRAMUSCULAR; INTRAVENOUS; SUBCUTANEOUS
Status: DISCONTINUED | OUTPATIENT
Start: 2018-04-30 | End: 2018-05-03 | Stop reason: HOSPADM

## 2018-04-29 RX ORDER — NALOXONE HYDROCHLORIDE 0.4 MG/ML
.1-.4 INJECTION, SOLUTION INTRAMUSCULAR; INTRAVENOUS; SUBCUTANEOUS
Status: DISCONTINUED | OUTPATIENT
Start: 2018-04-29 | End: 2018-04-29

## 2018-04-29 RX ORDER — EPHEDRINE SULFATE 50 MG/ML
5 INJECTION, SOLUTION INTRAMUSCULAR; INTRAVENOUS; SUBCUTANEOUS
Status: DISCONTINUED | OUTPATIENT
Start: 2018-04-29 | End: 2018-04-29

## 2018-04-29 RX ORDER — DIPHENHYDRAMINE HCL 25 MG
25 CAPSULE ORAL EVERY 6 HOURS PRN
Status: DISCONTINUED | OUTPATIENT
Start: 2018-04-29 | End: 2018-05-03 | Stop reason: HOSPADM

## 2018-04-29 RX ORDER — CEFAZOLIN SODIUM 1 G/3ML
INJECTION, POWDER, FOR SOLUTION INTRAMUSCULAR; INTRAVENOUS PRN
Status: DISCONTINUED | OUTPATIENT
Start: 2018-04-29 | End: 2018-04-29

## 2018-04-29 RX ORDER — OXYCODONE HYDROCHLORIDE 5 MG/1
5-10 TABLET ORAL
Status: DISCONTINUED | OUTPATIENT
Start: 2018-04-29 | End: 2018-05-03 | Stop reason: HOSPADM

## 2018-04-29 RX ORDER — HYDROMORPHONE HYDROCHLORIDE 1 MG/ML
.3-.5 INJECTION, SOLUTION INTRAMUSCULAR; INTRAVENOUS; SUBCUTANEOUS EVERY 5 MIN PRN
Status: DISCONTINUED | OUTPATIENT
Start: 2018-04-29 | End: 2018-04-29 | Stop reason: HOSPADM

## 2018-04-29 RX ORDER — CEFAZOLIN SODIUM 1 G/3ML
1 INJECTION, POWDER, FOR SOLUTION INTRAMUSCULAR; INTRAVENOUS SEE ADMIN INSTRUCTIONS
Status: DISCONTINUED | OUTPATIENT
Start: 2018-04-29 | End: 2018-04-29 | Stop reason: HOSPADM

## 2018-04-29 RX ORDER — METHYLERGONOVINE MALEATE 0.2 MG/ML
INJECTION INTRAVENOUS PRN
Status: DISCONTINUED | OUTPATIENT
Start: 2018-04-29 | End: 2018-04-29

## 2018-04-29 RX ORDER — OXYCODONE HYDROCHLORIDE 5 MG/1
5 TABLET ORAL EVERY 4 HOURS PRN
Status: DISCONTINUED | OUTPATIENT
Start: 2018-04-29 | End: 2018-05-03 | Stop reason: HOSPADM

## 2018-04-29 RX ORDER — CITRIC ACID/SODIUM CITRATE 334-500MG
30 SOLUTION, ORAL ORAL
Status: COMPLETED | OUTPATIENT
Start: 2018-04-29 | End: 2018-04-29

## 2018-04-29 RX ORDER — OXYTOCIN/0.9 % SODIUM CHLORIDE 30/500 ML
100 PLASTIC BAG, INJECTION (ML) INTRAVENOUS CONTINUOUS
Status: DISCONTINUED | OUTPATIENT
Start: 2018-04-29 | End: 2018-05-03 | Stop reason: HOSPADM

## 2018-04-29 RX ORDER — OXYTOCIN/0.9 % SODIUM CHLORIDE 30/500 ML
340 PLASTIC BAG, INJECTION (ML) INTRAVENOUS CONTINUOUS PRN
Status: DISCONTINUED | OUTPATIENT
Start: 2018-04-29 | End: 2018-05-03 | Stop reason: HOSPADM

## 2018-04-29 RX ORDER — SODIUM CHLORIDE, SODIUM LACTATE, POTASSIUM CHLORIDE, CALCIUM CHLORIDE 600; 310; 30; 20 MG/100ML; MG/100ML; MG/100ML; MG/100ML
INJECTION, SOLUTION INTRAVENOUS CONTINUOUS
Status: CANCELLED | OUTPATIENT
Start: 2018-04-29

## 2018-04-29 RX ORDER — ONDANSETRON 2 MG/ML
4 INJECTION INTRAMUSCULAR; INTRAVENOUS EVERY 6 HOURS PRN
Status: DISCONTINUED | OUTPATIENT
Start: 2018-04-29 | End: 2018-04-29

## 2018-04-29 RX ORDER — KETOROLAC TROMETHAMINE 30 MG/ML
30 INJECTION, SOLUTION INTRAMUSCULAR; INTRAVENOUS EVERY 6 HOURS
Status: COMPLETED | OUTPATIENT
Start: 2018-04-30 | End: 2018-04-30

## 2018-04-29 RX ORDER — SODIUM CHLORIDE, SODIUM LACTATE, POTASSIUM CHLORIDE, CALCIUM CHLORIDE 600; 310; 30; 20 MG/100ML; MG/100ML; MG/100ML; MG/100ML
INJECTION, SOLUTION INTRAVENOUS CONTINUOUS
Status: DISCONTINUED | OUTPATIENT
Start: 2018-04-29 | End: 2018-04-29 | Stop reason: HOSPADM

## 2018-04-29 RX ORDER — OXYTOCIN/0.9 % SODIUM CHLORIDE 30/500 ML
PLASTIC BAG, INJECTION (ML) INTRAVENOUS CONTINUOUS PRN
Status: DISCONTINUED | OUTPATIENT
Start: 2018-04-29 | End: 2018-04-29

## 2018-04-29 RX ORDER — IBUPROFEN 400 MG/1
400 TABLET, FILM COATED ORAL EVERY 6 HOURS PRN
Status: DISCONTINUED | OUTPATIENT
Start: 2018-04-29 | End: 2018-05-03 | Stop reason: HOSPADM

## 2018-04-29 RX ORDER — HYDROCORTISONE 2.5 %
CREAM (GRAM) TOPICAL 3 TIMES DAILY PRN
Status: DISCONTINUED | OUTPATIENT
Start: 2018-04-29 | End: 2018-05-03 | Stop reason: HOSPADM

## 2018-04-29 RX ORDER — HYDROMORPHONE HYDROCHLORIDE 1 MG/ML
.3-.5 INJECTION, SOLUTION INTRAMUSCULAR; INTRAVENOUS; SUBCUTANEOUS
Status: COMPLETED | OUTPATIENT
Start: 2018-04-29 | End: 2018-04-29

## 2018-04-29 RX ORDER — OXYTOCIN/0.9 % SODIUM CHLORIDE 30/500 ML
PLASTIC BAG, INJECTION (ML) INTRAVENOUS
Status: DISCONTINUED
Start: 2018-04-29 | End: 2018-04-29 | Stop reason: HOSPADM

## 2018-04-29 RX ORDER — LIDOCAINE 40 MG/G
CREAM TOPICAL
Status: DISCONTINUED | OUTPATIENT
Start: 2018-04-29 | End: 2018-05-03 | Stop reason: HOSPADM

## 2018-04-29 RX ORDER — SODIUM CHLORIDE, SODIUM LACTATE, POTASSIUM CHLORIDE, CALCIUM CHLORIDE 600; 310; 30; 20 MG/100ML; MG/100ML; MG/100ML; MG/100ML
INJECTION, SOLUTION INTRAVENOUS CONTINUOUS PRN
Status: DISCONTINUED | OUTPATIENT
Start: 2018-04-29 | End: 2018-04-29

## 2018-04-29 RX ORDER — ACETAMINOPHEN 325 MG/1
975 TABLET ORAL EVERY 8 HOURS
Status: DISPENSED | OUTPATIENT
Start: 2018-04-29 | End: 2018-05-02

## 2018-04-29 RX ORDER — KETOROLAC TROMETHAMINE 30 MG/ML
INJECTION, SOLUTION INTRAMUSCULAR; INTRAVENOUS PRN
Status: DISCONTINUED | OUTPATIENT
Start: 2018-04-29 | End: 2018-04-29

## 2018-04-29 RX ORDER — NALOXONE HYDROCHLORIDE 0.4 MG/ML
.1-.4 INJECTION, SOLUTION INTRAMUSCULAR; INTRAVENOUS; SUBCUTANEOUS
Status: DISCONTINUED | OUTPATIENT
Start: 2018-04-29 | End: 2018-05-03 | Stop reason: HOSPADM

## 2018-04-29 RX ORDER — MORPHINE SULFATE 1 MG/ML
INJECTION, SOLUTION EPIDURAL; INTRATHECAL; INTRAVENOUS
Status: DISCONTINUED
Start: 2018-04-29 | End: 2018-04-29 | Stop reason: HOSPADM

## 2018-04-29 RX ORDER — AMOXICILLIN 250 MG
2 CAPSULE ORAL 2 TIMES DAILY PRN
Status: DISCONTINUED | OUTPATIENT
Start: 2018-04-29 | End: 2018-05-03 | Stop reason: HOSPADM

## 2018-04-29 RX ORDER — ONDANSETRON 4 MG/1
4 TABLET, ORALLY DISINTEGRATING ORAL EVERY 6 HOURS PRN
Status: DISCONTINUED | OUTPATIENT
Start: 2018-04-29 | End: 2018-04-29

## 2018-04-29 RX ORDER — MISOPROSTOL 200 UG/1
400 TABLET ORAL
Status: DISCONTINUED | OUTPATIENT
Start: 2018-04-29 | End: 2018-05-03 | Stop reason: HOSPADM

## 2018-04-29 RX ORDER — AMOXICILLIN 250 MG
1 CAPSULE ORAL 2 TIMES DAILY PRN
Status: DISCONTINUED | OUTPATIENT
Start: 2018-04-29 | End: 2018-05-03 | Stop reason: HOSPADM

## 2018-04-29 RX ORDER — PROPOFOL 10 MG/ML
INJECTION, EMULSION INTRAVENOUS PRN
Status: DISCONTINUED | OUTPATIENT
Start: 2018-04-29 | End: 2018-04-29

## 2018-04-29 RX ORDER — ACETAMINOPHEN 325 MG/1
650 TABLET ORAL EVERY 4 HOURS PRN
Status: DISCONTINUED | OUTPATIENT
Start: 2018-05-02 | End: 2018-05-03 | Stop reason: HOSPADM

## 2018-04-29 RX ORDER — FENTANYL CITRATE 50 UG/ML
25-50 INJECTION, SOLUTION INTRAMUSCULAR; INTRAVENOUS
Status: CANCELLED | OUTPATIENT
Start: 2018-04-29

## 2018-04-29 RX ORDER — CITRIC ACID/SODIUM CITRATE 334-500MG
SOLUTION, ORAL ORAL
Status: COMPLETED
Start: 2018-04-29 | End: 2018-04-29

## 2018-04-29 RX ORDER — IBUPROFEN 800 MG/1
800 TABLET, FILM COATED ORAL EVERY 6 HOURS PRN
Status: DISCONTINUED | OUTPATIENT
Start: 2018-04-29 | End: 2018-05-03 | Stop reason: HOSPADM

## 2018-04-29 RX ORDER — NALBUPHINE HYDROCHLORIDE 10 MG/ML
2.5-5 INJECTION, SOLUTION INTRAMUSCULAR; INTRAVENOUS; SUBCUTANEOUS EVERY 6 HOURS PRN
Status: DISCONTINUED | OUTPATIENT
Start: 2018-04-29 | End: 2018-04-29

## 2018-04-29 RX ORDER — BISACODYL 10 MG
10 SUPPOSITORY, RECTAL RECTAL DAILY PRN
Status: DISCONTINUED | OUTPATIENT
Start: 2018-05-01 | End: 2018-05-03 | Stop reason: HOSPADM

## 2018-04-29 RX ORDER — DEXTROSE, SODIUM CHLORIDE, SODIUM LACTATE, POTASSIUM CHLORIDE, AND CALCIUM CHLORIDE 5; .6; .31; .03; .02 G/100ML; G/100ML; G/100ML; G/100ML; G/100ML
INJECTION, SOLUTION INTRAVENOUS CONTINUOUS
Status: DISCONTINUED | OUTPATIENT
Start: 2018-04-29 | End: 2018-05-03 | Stop reason: HOSPADM

## 2018-04-29 RX ORDER — LIDOCAINE 40 MG/G
CREAM TOPICAL
Status: DISCONTINUED | OUTPATIENT
Start: 2018-04-29 | End: 2018-04-29

## 2018-04-29 RX ORDER — ONDANSETRON 4 MG/1
4 TABLET, ORALLY DISINTEGRATING ORAL EVERY 30 MIN PRN
Status: DISCONTINUED | OUTPATIENT
Start: 2018-04-29 | End: 2018-04-29 | Stop reason: HOSPADM

## 2018-04-29 RX ORDER — LANOLIN 100 %
OINTMENT (GRAM) TOPICAL
Status: DISCONTINUED | OUTPATIENT
Start: 2018-04-29 | End: 2018-05-03 | Stop reason: HOSPADM

## 2018-04-29 RX ORDER — BUPIVACAINE HYDROCHLORIDE 7.5 MG/ML
INJECTION, SOLUTION EPIDURAL; RETROBULBAR
Status: DISCONTINUED
Start: 2018-04-29 | End: 2018-04-29 | Stop reason: HOSPADM

## 2018-04-29 RX ORDER — FENTANYL CITRATE 50 UG/ML
INJECTION, SOLUTION INTRAMUSCULAR; INTRAVENOUS PRN
Status: DISCONTINUED | OUTPATIENT
Start: 2018-04-29 | End: 2018-04-29

## 2018-04-29 RX ADMIN — FENTANYL CITRATE 50 MCG: 50 INJECTION INTRAMUSCULAR; INTRAVENOUS at 19:08

## 2018-04-29 RX ADMIN — PHENYLEPHRINE HYDROCHLORIDE 100 MCG: 10 INJECTION, SOLUTION INTRAMUSCULAR; INTRAVENOUS; SUBCUTANEOUS at 17:59

## 2018-04-29 RX ADMIN — FENTANYL CITRATE 50 MCG: 50 INJECTION INTRAMUSCULAR; INTRAVENOUS at 19:27

## 2018-04-29 RX ADMIN — Medication 100 MG: at 17:48

## 2018-04-29 RX ADMIN — PROPOFOL 200 MG: 10 INJECTION, EMULSION INTRAVENOUS at 17:48

## 2018-04-29 RX ADMIN — SODIUM CHLORIDE, POTASSIUM CHLORIDE, SODIUM LACTATE AND CALCIUM CHLORIDE: 600; 310; 30; 20 INJECTION, SOLUTION INTRAVENOUS at 16:58

## 2018-04-29 RX ADMIN — SODIUM CHLORIDE, SODIUM LACTATE, POTASSIUM CHLORIDE, CALCIUM CHLORIDE AND DEXTROSE MONOHYDRATE: 5; 600; 310; 30; 20 INJECTION, SOLUTION INTRAVENOUS at 23:55

## 2018-04-29 RX ADMIN — SODIUM CHLORIDE, POTASSIUM CHLORIDE, SODIUM LACTATE AND CALCIUM CHLORIDE 1000 ML: 600; 310; 30; 20 INJECTION, SOLUTION INTRAVENOUS at 14:41

## 2018-04-29 RX ADMIN — HYDROMORPHONE HYDROCHLORIDE 0.3 MG: 1 INJECTION, SOLUTION INTRAMUSCULAR; INTRAVENOUS; SUBCUTANEOUS at 23:09

## 2018-04-29 RX ADMIN — PHENYLEPHRINE HYDROCHLORIDE 100 MCG: 10 INJECTION, SOLUTION INTRAMUSCULAR; INTRAVENOUS; SUBCUTANEOUS at 18:14

## 2018-04-29 RX ADMIN — CEFAZOLIN 2 G: 1 INJECTION, POWDER, FOR SOLUTION INTRAMUSCULAR; INTRAVENOUS at 17:47

## 2018-04-29 RX ADMIN — OXYTOCIN-SODIUM CHLORIDE 0.9% IV SOLN 30 UNIT/500ML 100 ML/HR: 30-0.9/5 SOLUTION at 20:40

## 2018-04-29 RX ADMIN — KETOROLAC TROMETHAMINE 30 MG: 30 INJECTION, SOLUTION INTRAMUSCULAR at 18:44

## 2018-04-29 RX ADMIN — HYDROMORPHONE HYDROCHLORIDE 0.5 MG: 1 INJECTION, SOLUTION INTRAMUSCULAR; INTRAVENOUS; SUBCUTANEOUS at 18:51

## 2018-04-29 RX ADMIN — ACETAMINOPHEN 975 MG: 325 TABLET ORAL at 23:09

## 2018-04-29 RX ADMIN — SODIUM CHLORIDE, POTASSIUM CHLORIDE, SODIUM LACTATE AND CALCIUM CHLORIDE: 600; 310; 30; 20 INJECTION, SOLUTION INTRAVENOUS at 17:15

## 2018-04-29 RX ADMIN — OXYTOCIN-SODIUM CHLORIDE 0.9% IV SOLN 30 UNIT/500ML 300 ML/HR: 30-0.9/5 SOLUTION at 17:52

## 2018-04-29 RX ADMIN — PHENYLEPHRINE HYDROCHLORIDE 100 MCG: 10 INJECTION, SOLUTION INTRAMUSCULAR; INTRAVENOUS; SUBCUTANEOUS at 17:56

## 2018-04-29 RX ADMIN — FENTANYL CITRATE 50 MCG: 50 INJECTION INTRAMUSCULAR; INTRAVENOUS at 19:03

## 2018-04-29 RX ADMIN — BUPIVACAINE 20 ML: 13.3 INJECTION, SUSPENSION, LIPOSOMAL INFILTRATION at 19:48

## 2018-04-29 RX ADMIN — HYDROMORPHONE HYDROCHLORIDE 0.5 MG: 1 INJECTION, SOLUTION INTRAMUSCULAR; INTRAVENOUS; SUBCUTANEOUS at 18:06

## 2018-04-29 RX ADMIN — METHYLERGONOVINE MALEATE 200 MCG: 0.2 INJECTION INTRAMUSCULAR; INTRAVENOUS at 17:59

## 2018-04-29 RX ADMIN — HYDROMORPHONE HYDROCHLORIDE 0.3 MG: 1 INJECTION, SOLUTION INTRAMUSCULAR; INTRAVENOUS; SUBCUTANEOUS at 20:10

## 2018-04-29 RX ADMIN — SODIUM CITRATE AND CITRIC ACID MONOHYDRATE 30 ML: 500; 334 SOLUTION ORAL at 16:31

## 2018-04-29 RX ADMIN — ONDANSETRON 4 MG: 2 INJECTION INTRAMUSCULAR; INTRAVENOUS at 18:01

## 2018-04-29 RX ADMIN — Medication 30 ML: at 16:31

## 2018-04-29 RX ADMIN — FENTANYL CITRATE 100 MCG: 50 INJECTION, SOLUTION INTRAMUSCULAR; INTRAVENOUS at 17:56

## 2018-04-29 NOTE — IP AVS SNAPSHOT
MRN:[id number]                      After Visit Summary   2018    Echo Lea    MRN: [id number]           Thank you!     Thank you for choosing [facility] for your care. Our goal is always to provide you with excellent care. Hearing back from our patients is one way we can continue to improve our services. Please take a few minutes to complete the written survey that you may receive in the mail after you visit with us. Thank you!        Patient Information     Date Of Birth          1975        Designated Caregiver       Most Recent Value    Caregiver    Will someone help with your care after discharge? no      About your hospital stay     You were admitted on:  2018 You last received care in the:  [facility]    You were discharged on:  May 3, 2018        Reason for your hospital stay       Maternity care                  Who to Call     For medical emergencies, please call 911.  For non-urgent questions about your medical care, please call your primary care provider or clinic, [phone]  For questions related to your surgery, please call your surgery clinic        Attending Provider     Provider Specialty    Fidelia Roach MD OB/Gyn    Den, Debi Mccarthy MD OB/Gyn       Primary Care Provider Office Phone # Fax #    John Cobb [phone]      After Care Instructions     Activity       Review discharge instructions    Activity Instructions:   -  delivery: No lifting more than 15 pounds for 6 weeks.  Nothing in the vagina for 6 weeks, no intercourse for 6 weeks. No strenuous exercise for 6 weeks. No driving until you have stopped taking your pain medications.      Call your health care provider if you have any of the following: Fever above 100.4 F; opening or drainage from your incision; soaking a sanitary pad with blood within 1 hour, or you see blood clots larger than a golf ball; malodorous vaginal discharge, severe or worsening pain  uncontrolled by your pain medications, nausea and vomiting, severe headaches, changes in vision, calf swelling or pain, shortness of breath, problems coping with sadness, anxiety, or depression.  If you have any concerns about hurting yourself or the baby, call your provider immediately. You are encouraged to call with questions or concerns after you return home.            Diet       Resume previous diet            Discharge Instructions - Postpartum visit       Schedule postpartum visit with your provider and return to clinic in 6 weeks.                  Follow-up Appointments     Follow Up and recommended labs and tests       Follow-up in 6 weeks for routine postpartum visit.                  Future tests that were ordered for you     ABO/Rh Type and Screen                 Further instructions from your care team       Postop  Birth Instructions    Activity       Do not lift more than 10 pounds for 6 weeks after surgery.  Ask family and friends for help when you need it.    No driving until you have stopped taking your pain medications (usually two weeks after surgery).    No heavy exercise or activity for 6 weeks.  Don't do anything that will put a strain on your surgery site.    Don't strain when using the toilet.  Your care team may prescribe a stool softener if you have problems with your bowel movements.     To care for your incision:       Keep the incision clean and dry.    Do not soak your incision in water. No swimming or hot tubs until it has fully healed. You may soak in the bathtub if the water level is below your incision.    Do not use peroxide, gel, cream, lotion, or ointment on your incision.    Adjust your clothes to avoid pressure on your surgery site (check the elastic in your underwear for example).     You may see a small amount of clear or pink drainage and this is normal.  Check with your health care provider:       If the drainage increases or has an odor.    If the incision  reddens, you have swelling, or develop a rash.    If you have increased pain and the medicine we prescribed doesn't help.    If you have a fever above 100.4 F (38 C) with or without chills when placing thermometer under your tongue.   The area around your incision (surgery wound), will feel numb.  This is normal. The numbness should go away in less than a year.     Keep your hands clean:  Always wash your hands before touching your incision (surgery wound). This helps reduce your risk of infection. If your hands aren't dirty, you may use an alcohol hand-rub to clean your hands. Keep your nails clean and short.    Call your healthcare provider if you have any of these symptoms:       You soak a sanitary pad with blood within 1 hour, or you see blood clots larger than a golf ball.    Bleeding that lasts more than 6 weeks.    Vaginal discharge that smells bad.    Severe pain, cramping or tenderness in your lower belly area.    A need to urinate more frequently (use the toilet more often), more urgently (use the toilet very quickly), or it burns when you urinate.    Nausea and vomiting.    Redness, swelling or pain around a vein in your leg.    Problems breastfeeding or a red or painful area on your breast.    Chest pain and cough or are gasping for air.    Problems with coping with sadness, anxiety or depression. If you have concerns about hurting yourself or the baby, call your provider immediately.      You have questions or concerns after you return home.     Follow up in 6 weeks for post delivery check up.              Pending Results     Date and Time Order Name Status Description    4/29/2018 2002 Placenta path order and indications In process             Statement of Approval     Ordered          05/03/18 0747  I have reviewed and agree with all the recommendations and orders detailed in this document.  EFFECTIVE NOW     Approved and electronically signed by:  Keny Bryant MD             Admission Information      "Date & Time Provider Department Dept. Phone    2018 Debi Wilkinson MD [facility] [phone]      Your Vitals Were     Blood Pressure Pulse Temperature Respirations Height Weight    127/74 69 98.9  F (37.2  C) (Oral) 16 1.651 m (5' 5\") 70.8 kg (156 lb)    Last Period Pulse Oximetry BMI (Body Mass Index)             2017 100% 25.96 kg/m2         [employer] Information     [employer] lets you send messages to your doctor, view your test results, renew your prescriptions, schedule appointments and more. To sign up, go to [email or web address] . Click on \"Log in\" on the left side of the screen, which will take you to the Welcome page. Then click on \"Sign up Now\" on the right side of the page.     You will be asked to enter the access code listed below, as well as some personal information. Please follow the directions to create your username and password.     Your access code is: [email or web address]  Expires: 2018 10:21 AM     Your access code will  in 90 days. If you need help or a new code, please call your [facility] clinic or [phone].        Care EveryWhere ID     This is your Care EveryWhere ID. This could be used by other organizations to access your [facility] medical records  [id number]        Equal Access to Services     [facility] California[facility]INAYAK AH: Hadii sd Wills, waaxda luhiwotadaha, qaybta kaalmada alfonso santillan . So [facility] [place] [phone].    ATENCIÓN: Si habla español, tiene a garcia disposición servicios gratuitos de asistencia lingüística. Sonido al [phone].    We comply with applicable federal civil rights laws and [place] laws. We do not discriminate on the basis of race, color, national origin, age, disability, sex, sexual orientation, or gender identity.               Review of your medicines      START taking        Dose / Directions    acetaminophen 325 MG tablet   Commonly known as:  TYLENOL        Dose:  650 mg   Take 2 tablets (650 mg) by mouth " every 4 hours as needed for other (multimodal surgical pain management along with NSAIDS and opioid medication as indicated based on pain control and physical function.)   Quantity:  60 tablet   Refills:  0       ferrous sulfate 325 (65 Fe) MG tablet   Commonly known as:  IRON   Used for:  Anemia due to blood loss, acute        Dose:  325 mg   Take 1 tablet (325 mg) by mouth daily (with breakfast)   Quantity:  30 tablet   Refills:  0       ibuprofen 600 MG tablet   Commonly known as:  ADVIL/MOTRIN        Dose:  600 mg   Take 1 tablet (600 mg) by mouth every 6 hours as needed for other (carmping)   Quantity:  30 tablet   Refills:  0       oxyCODONE IR 5 MG tablet   Commonly known as:  ROXICODONE        Dose:  5 mg   Take 1 tablet (5 mg) by mouth every 6 hours as needed for moderate to severe pain   Quantity:  36 tablet   Refills:  0       senna-docusate 8.6-50 MG per tablet   Commonly known as:  SENOKOT-S;PERICOLACE        Dose:  1 tablet   Take 1 tablet by mouth 2 times daily as needed for constipation   Quantity:  40 tablet   Refills:  0         CONTINUE these medicines which have NOT CHANGED        Dose / Directions    prenatal multivitamin plus iron 27-0.8 MG Tabs per tablet        Dose:  1 tablet   Take 1 tablet by mouth daily   Refills:  0            Where to get your medicines      These medications were sent to [facility] [place], [place] - [address]  [address] [address]     Phone:  [phone]     acetaminophen 325 MG tablet    ferrous sulfate 325 (65 Fe) MG tablet    ibuprofen 600 MG tablet    senna-docusate 8.6-50 MG per tablet         Some of these will need a paper prescription and others can be bought over the counter. Ask your nurse if you have questions.     Bring a paper prescription for each of these medications     oxyCODONE IR 5 MG tablet                Protect others around you: Learn how to safely use, store and throw away your medicines at  "www.disposemymeds.org.        Information about your nerve block     Today you received a block to numb the nerves near your surgery site.    This is a block using local anesthetic or \"numbing\" medication injected around the nerves to anesthetize or \"numb\" the area supplied by those nerves. This block is injected into the muscle layer near your surgical site. The type of anesthesia (Exparel) your anesthesia team used to numb your abdomen may give you relief for up to 72 hours.     Diet: There are no diet restrictions, but you should drink plenty of fluids, unless you are on a fluid-restricted diet.     Activity: If your surgical site is an arm or leg you should be careful with your affected limb, since it is possible to injure your limb without being aware of it due to the numbing. Until full feeling returns, you should guard against bumping or hitting your limb, and avoid extreme hot or cold temperatures on the skin.    Pain Medication: As the block wears off, the feeling will return as a tingling or prickly sensation near your surgical site. You will experience more discomfort from your incisions as the feeling returns. You may want to take a pain pill (a narcotic or Tylenol if this was prescribed by your surgeon) when you start to experience mild pain, before the pain becomes more severe. If your pain medications do not control your pain, you should notify your surgeon. If you are taking narcotics for pain management, do not drink alcohol, drive a car, or perform hazardous activities.  If you have questions or concerns you may call your surgeon at the number provided with your discharge instructions.     Call your surgeon if you experience blurry vision, ringing in the ears or metallic taste in your mouth.         Information about OPIOIDS     PRESCRIPTION OPIOIDS: WHAT YOU NEED TO KNOW   You have a prescription for an opioid (narcotic) pain medicine. Opioids can cause addiction. If you have a history of chemical " dependency of any type, you are at a higher risk of becoming addicted to opioids. Only take this medicine after all other options have been tried. Take it for as short a time and as few doses as possible.     Do not:    Drive. If you drive while taking these medicines, you could be arrested for driving under the influence (DUI).    Operate heavy machinery    Do any other dangerous activities while taking these medicines.     Drink any alcohol while taking these medicines.      Take with any other medicines that contain acetaminophen. Read all labels carefully. Look for the word  acetaminophen  or  Tylenol.  Ask your pharmacist if you have questions or are unsure.    Store your pills in a secure place, locked if possible. We will not replace any lost or stolen medicine. If you don t finish your medicine, please throw away (dispose) as directed by your pharmacist. The [place] Pollution Control Agency has more information about safe disposal: https://www.pca.[facility].[place]/living-green/managing-unwanted-medications    All opioids tend to cause constipation. Drink plenty of water and eat foods that have a lot of fiber, such as fruits, vegetables, prune juice, apple juice and high-fiber cereal. Take a laxative (Miralax, milk of magnesia, Colace, Senna) if you don t move your bowels at least every other day.              Medication List: This is a list of all your medications and when to take them. Check marks below indicate your daily home schedule. Keep this list as a reference.      Medications           Morning Afternoon Evening Bedtime As Needed    acetaminophen 325 MG tablet   Commonly known as:  TYLENOL   Take 2 tablets (650 mg) by mouth every 4 hours as needed for other (multimodal surgical pain management along with NSAIDS and opioid medication as indicated based on pain control and physical function.)   Last time this was given:  650 mg on 5/3/2018  7:08 AM                                ferrous sulfate 325 (65  Fe) MG tablet   Commonly known as:  IRON   Take 1 tablet (325 mg) by mouth daily (with breakfast)                                ibuprofen 600 MG tablet   Commonly known as:  ADVIL/MOTRIN   Take 1 tablet (600 mg) by mouth every 6 hours as needed for other (carmping)   Last time this was given:  800 mg on 5/3/2018  7:50 AM                                oxyCODONE IR 5 MG tablet   Commonly known as:  ROXICODONE   Take 1 tablet (5 mg) by mouth every 6 hours as needed for moderate to severe pain   Last time this was given:  5 mg on 5/3/2018  7:07 AM                                prenatal multivitamin plus iron 27-0.8 MG Tabs per tablet   Take 1 tablet by mouth daily                                senna-docusate 8.6-50 MG per tablet   Commonly known as:  SENOKOT-S;PERICOLACE   Take 1 tablet by mouth 2 times daily as needed for constipation   Last time this was given:  1 tablet on 5/2/2018  8:33 AM

## 2018-04-29 NOTE — CONSULTS
Neonatology Antepartum Counseling Consult      I was asked to provide antepartum counseling for Echo Lea at the request of Debi Wilkinson MD secondary to chronic abruption. Ms. Lea is currently 35 weeks and has a hx significant for fetus with suspected trisomy 21, VSD and polyhydramnios. Betamethasone was administered x2 doses. Ms. Lea, was counseled on the expected hospital course, potential risks, and outcomes associated with an infant born at approximately 35 weeks gestation. The counseling included: initial delivery room stabilization, respiratory course, lung development, hyperbilirubinemia, infection, nutrition, growth and development, and long term outcomes. Please feel free to call with any additional questions or concerns.          Jazmin TEIXEIRA CNP, 2018 3:49 PM  [facility]   Intensive Care Unit      Floor Time (min): 5  Face to Face Time (min): 20  Total Time (minutes): 25  More than 50% of my time was spent in direct, face to face, antepartum counseling with the above patient.

## 2018-04-29 NOTE — H&P
"[facility] Labor and Delivery History and Physical    Echo Lea   MRN# [id number]     43 year old   YOB: 1975       Date of Admission: 2018     Primary care provider: John Cobb    CC: Large amounts of vaginal bleeding yesterday, [facility]    HPI: Echo Lea  is a 43 year old  at 35w0d by 22w2d US who presents via transfer from [facility].     Echo's pregnancy is complicated by suspected fetal Trisomy 21 by ultrasound findings (multiple VSD, thick nuchal fold, sandal gap), as well as Innatal positive for Trisomy 21. She also has polyhydramnios.     Yesterday around 1100 she was driving to the grocery store when she had a huge gush of blood, filling her pants and \"flooding the car seat,\" so she went immediately to the hospital. The bleeding stopped soon after she past \"a few\" golf ball sized clots. Prior to this she felt crampy and uncomfortable (beyond her baseline) but after passing the clots felt a little better. While inpatient, she had a single golf ball sized clot at 0200 but since then has only had pink spotting on her pads. She feels fetal movement is more than normal. She does not feel dizzy or lightheaded. She does occasionally feel contractions that make her grimace.     She denies dysuria, HA, vision changes, chest pain, SOB, or swelling in her extremities.     Pregnancy complicated by:  1. AMA  2. Trisomy 21 of fetus, based on US markers, with VSD x3, positive NIPT  3. Polyhydramnios (PHYLLIS 28) diagnosed on 4/10 US  4. Hx of GDM, diet controlled    ROS:   Complete 10-point ROS negative except as noted in HPI.     Pregnancy History:  OBSTETRIC HISTORY:    Two early miscarriages and that she has had successful pregnancies since that time. She reports normal vaginal deliveries at term for her other 5 children.     Prenatal Labs (18):  Blood type: O   Rh: positive  Antibody screen: negative  Hgb: 13.2 g/dL  Plt: 265  HIV: " "negative  HBsAg: negative  CBC: HGB, Plt  Rubella: Immune  VDRL: negative  GC/Chlam: No results available  GCT: No results available    GBS Status:       Medication Prior to Admission  PNV     Medical History  ASCUS pap, neg HPV    Surgical History  Denies    Family History  No family hx of bleeding or clotting disorders    Social History:  Lives with partner and daugter. Denies tobacco, ETOH or drug use during pregnancy.    Physical Exam:  Patient Vitals for the past 12 hrs:   BP Temp Temp src Resp Height Weight   18 1326 112/74 - - - - -   18 1325 - 97.8  F (36.6  C) Oral 16 1.651 m (5' 5\") 70.8 kg (156 lb)     Gen: Appears older than stated age, NAD  CV: RRR, no murmurs  Pulm: CTAB, no wheezes   GI: gravid, soft, nontender.   Cervix: soft, multiparous 3-4/70/-2, pink discharge  Presentation: cephalic back to maternal R    Fetal Heart Rate Tracing: Baseline 130s, moderate variability, no accels at this time, no decels  Tocometer: 1-2 contractions in 10 minutes    US: 4/10/18: Impression  =========    1) Intrauterine pregnancy at 32 3/7 weeks gestational age.  2) Known VSDs, however not visualized today.  3) The cisterna magna and CNS appeared normal today.  4) None of the other anomalies commonly detected by ultrasound were evident in the fetal anatomic survey described above.  5) Growth parameters and estimated fetal weight were consistent with an appropriate for gestation age pattern of growth.  6) New onset polyhydramnios.  7) Reassuring BPP.    Assessment:   Echo Lea  is a 43 year old  at 35w0d by 22w2d US who presents via transfer from [facility] . Patient's pregnancy significant for AMA, suspected trisomy 21 of fetus with VSD x3, polyhydramnios, and hx of GDM (not in current pregnancy). Plan for delivery today via CS, see below.     Plan:  #Vaginal bleeding, polyhydramnios  - Presumably chronic abruption, significant and increasing bleeding. Delivery indicated as there was " probably significant fetal blood loss. Long discussion regarding  section versus attempting vaginal delivery. We discussed that she could likely have a rapid vaginal delivery given advanced cervical dilation; however, she is at high risk for worsening abruption in the setting of ROM, and for cord prolapse. We discussed that both of these things would mean she'd need an emergency  section. A planned CS would be a major surgery, but would eliminate the risk of further abruption or cord prolapse. Ultimately after discussing risks and benefits she agrees to proceed with CS.     #FWB  - NICU present at delivery given VSD, possible duodenal atresia  - T21 with known VSD, plan for fetal echo immediately PP  - Currently category I, not yet reactive, but incomplete tracing.     #PNC  - GBS pending from OSH    # Pain Assessment:  # Pain Assessment:  Current Pain Score 2018   Patient currently in pain? yes   Pain location Back   Pain descriptors Aching   - Echo is experiencing pain due to polyhydramnios. Pain management was discussed and the plan was created in a collaborative fashion.  Echo's response to the current recommendations: engaged  - Deliver baby then routine PP pain control    Piper Gardner, PGY3  OB/Gyn  2018, 2:00 PM

## 2018-04-29 NOTE — IP AVS SNAPSHOT
[facility]    [address] [address]    Phone:  [phone]                                       After Visit Summary   4/29/2018    Echo Lea    MRN: [id number]           After Visit Summary Signature Page     I have received my discharge instructions, and my questions have been answered. I have discussed any challenges I see with this plan with the nurse or doctor.    ..........................................................................................................................................  Patient/Patient Representative Signature      ..........................................................................................................................................  Patient Representative Print Name and Relationship to Patient    ..................................................               ................................................  Date                                            Time    ..........................................................................................................................................  Reviewed by Signature/Title    ...................................................              ..............................................  Date                                                            Time

## 2018-04-29 NOTE — PROVIDER NOTIFICATION
04/29/18 1450   Provider Notification   Provider Name/Title Dr Wilkinson   Method of Notification At Bedside   Request Evaluate in Person   Notification Reason Other (Comment)   Dr Wilkinson at bed side to meet and evaluate patient. RN reported that due to PHYLLIS 40, difficulty tracing FHTs unless holding on abdomen.  Dr Wilkinson reviewed strip and okayed monitoring to be done when the baby comes off of tracing.

## 2018-04-29 NOTE — OP NOTE
[facility]  Full Operative Progress Note     Surgery Date:  2018   Surgeon:  Debi Wilkinson MD  Assistants:  Piper Gardner MD PGY3    Janee Perkins MD PGY3    Pre-op Diagnosis:    1. AMA  2. High suspicion for Trisomy 21 of fetus, based on US markers, with VSD x3, positive NIPT  3. Polyhydramnios (PHYLLIS 40) diagnosed on 4/10 US  4. Chronic abruption, recent heavy vaginal bleeding    Post-op Diagnosis:    1-4. Same, liveborn male infant    Procedure:  Primary low-transverse  section with double layer uterine closure via modified Francisco Graves incision    Anesthesia: GETA  EBL:  1200 mL, QBL pending  IVF:  1500 mL crystalloid  UOP:  300 mL clear urine at the end of the case  Drains: Alberts Catheter   Specimens:  Placenta, cord blood, cord segment  Complications: None     Indications:   Echo Lea is a 43 year old  at 35w0d admitted following an episode of heavy vaginal bleeding, with a suspected abruption. Fetal trisomy 21 was highly suspected based on NIPT. Although bleeding stopped after admission, suspicion for abruption was high (which could worsen with ROM) as well as risk for cord prolapse. The risks, benefits, and alternatives of  section were discussed with the patient, and she agreed to proceed.     Findings:   1. No intraabdominal adhesions  2. Thickly bloody amniotic fluid  3. Livebornmale infant in flotaing cephalic presentation. Apgars pending.   4. Arterial cord pH 7.36, base deficit 0.0. Venous cord pH 7.36, base deficit 0.4.  5. Normal uterus, fallopian tubes, and ovaries.     Procedure Details:   The patient was brought to the OR, where spinal anesthesia was attempted but could not be achieved. She was placed in the dorsal supine position with a slight leftward tilt. She was prepped and draped in the usual sterile fashion. A surgical time out was performed. Following this, adequate GETA was administered.  A modified Francisco Graves skin incision was made  with the scalpel, and carried down to the underlying fascia sharply. The fascia was incised in the midline, and the incision was extended with blunt pressure. The rectus muscles were  in the midline, and the peritoneum was entered bluntly, and the opening was extended with digital pressure. The bladder blade was placed. Immediately following adequate exposure, a transverse hysterotomy was made with the scalpel in the lower uterine segment, and the incision was extended with digital pressure.As below there was copious bloody fluid The infant was noted to be cephalic and was delivered atraumatically. The shoulders delivered easily. No nuchal cord was noted. The cord was doubly clamped and cut without delay, and the infant was handed off to the awaiting nursery staff. A segment of cord was cut and sent for gases. The placenta was delivered with gentle traction on the umbilical cord and uterine massage. The uterus was exteriorized and cleared of all clots and debris. Uterine tone was noted to be poor with 30 units of Pitocin given through the running IV and uterine massage, so methergine 0.2 mg was administered. The hysterotomy was closed with a running locked suture of 0 Monocryl.  The hysterotomy was then imbricated using an 0 Monocryl suture. There was an oozing area over the right apex of the incision, very close to the uterine vasculature, so two [place] sutures were placed. Following this we achieved hemostasis. The posterior cul-de-sac was cleared of all clots and debris. The uterus was returned to the abdomen. The pericolic gutters were also cleared of all clots and debris. The hysterotomy was reexamined and noted to be hemostatic. The fascia and rectus muscles were examined and areas of oozing were controlled with electrocautery. The fascia was closed with a running 0 Vicryl suture. The subcutaneous tissue was irrigated and areas of oozing were controlled with electrocautery. The subcutaneous tissue  was greater than 2 cm in thickness, and was therefore closed. The skin was closed with 4-0 Monocryl and covered with a sterile dressing.    All sponge, needle, and instrument counts were correct. The patient tolerated the procedure well, and was transferred to recovery in stable condition. Dr. Wilkinson was present and scrubbed for essential parts of the procedure.     Piper Gardner, PGY3  OB/Gyn  4/29/2018, 6:39 PM        Staff MD Note  I was present and scrubbed for the essential parts of the procedure noted above.  I agree with the description above and any necessary changes have been made by me.  Debi Wilkinson MD  4/29/2018  9:32 PM

## 2018-04-29 NOTE — ANESTHESIA PREPROCEDURE EVALUATION
Anesthesia Evaluation     . Pt has not had prior anesthetic            ROS/MED HX    ENT/Pulmonary:  - neg pulmonary ROS     Neurologic:  - neg neurologic ROS     Cardiovascular:  - neg cardiovascular ROS       METS/Exercise Tolerance:  >4 METS   Hematologic:  - neg hematologic  ROS   (+) Other Hematologic Disorder-h/o spontaneous abruption yesterday, contained, today only spotting.       Musculoskeletal:  - neg musculoskeletal ROS       GI/Hepatic:     (+) GERD       Renal/Genitourinary:  - ROS Renal section negative       Endo:  - neg endo ROS       Psychiatric:  - neg psychiatric ROS       Infectious Disease:  - neg infectious disease ROS       Malignancy:      - no malignancy   Other:    (+) Possibly pregnant C-spine cleared: N/A, no H/O Chronic Pain,no other significant disability                    Physical Exam  Normal systems: cardiovascular, pulmonary and dental    Airway   TM distance: >3 FB  Neck ROM: full    Dental     Cardiovascular   Rhythm and rate: regular and normal      Pulmonary    breath sounds clear to auscultation                    Anesthesia Plan      History & Physical Review  History and physical reviewed and following examination; no interval change.    ASA Status:  3 .    NPO Status:  > 8 hours and > 4 hours    Plan for Spinal   PONV prophylaxis:  Ondansetron (or other 5HT-3)  Additional equipment: Videolaryngoscope Pt has no surgical history, no family history of surgical issues.  Reviewed spinal, GA and TAP block risks including infection, inadequate pain relief, N/V need to switch to GA, sore throat, tooth damage, lung and hear tissues.        Postoperative Care  Postoperative pain management:  IV analgesics, Oral pain medications, Neuraxial analgesia and Peripheral nerve block (Single Shot).      Consents  Anesthetic plan, risks, benefits and alternatives discussed with:  Patient..

## 2018-04-29 NOTE — PLAN OF CARE
Problem: Patient Care Overview  Goal: Plan of Care/Patient Progress Review  Outcome: No Change  Pt arrives from [facility] via ambulance.  She has Poly with chronic abruption.  At present time, her bleeding is scant.  Admission data base completed, difficulty with fetal monitoring due to poly. Admission data base unremarkable except for pregnancy related.  Dr Gardner at bedside to perform ultra sound and come up with plan.  Will await fetal positioning for monitoring as well as plan.

## 2018-04-30 LAB
HGB BLD-MCNC: 8.7 G/DL (ref 11.7–15.7)
T PALLIDUM IGG+IGM SER QL: NEGATIVE

## 2018-04-30 PROCEDURE — 36415 COLL VENOUS BLD VENIPUNCTURE: CPT | Performed by: INTERNAL MEDICINE

## 2018-04-30 PROCEDURE — 25000132 ZZH RX MED GY IP 250 OP 250 PS 637: Performed by: INTERNAL MEDICINE

## 2018-04-30 PROCEDURE — 12000030 ZZH R&B OB INTERMEDIATE UMMC

## 2018-04-30 PROCEDURE — 25000128 H RX IP 250 OP 636: Performed by: INTERNAL MEDICINE

## 2018-04-30 PROCEDURE — 85018 HEMOGLOBIN: CPT | Performed by: INTERNAL MEDICINE

## 2018-04-30 RX ADMIN — OXYCODONE HYDROCHLORIDE 10 MG: 5 TABLET ORAL at 15:24

## 2018-04-30 RX ADMIN — OXYCODONE HYDROCHLORIDE 10 MG: 5 TABLET ORAL at 11:50

## 2018-04-30 RX ADMIN — ACETAMINOPHEN 975 MG: 325 TABLET ORAL at 15:24

## 2018-04-30 RX ADMIN — KETOROLAC TROMETHAMINE 30 MG: 30 INJECTION, SOLUTION INTRAMUSCULAR at 00:47

## 2018-04-30 RX ADMIN — SENNOSIDES AND DOCUSATE SODIUM 2 TABLET: 8.6; 5 TABLET ORAL at 08:38

## 2018-04-30 RX ADMIN — OXYCODONE HYDROCHLORIDE 5 MG: 5 TABLET ORAL at 05:48

## 2018-04-30 RX ADMIN — KETOROLAC TROMETHAMINE 30 MG: 30 INJECTION, SOLUTION INTRAMUSCULAR at 13:01

## 2018-04-30 RX ADMIN — KETOROLAC TROMETHAMINE 30 MG: 30 INJECTION, SOLUTION INTRAMUSCULAR at 18:54

## 2018-04-30 RX ADMIN — OXYCODONE HYDROCHLORIDE 10 MG: 5 TABLET ORAL at 08:39

## 2018-04-30 RX ADMIN — KETOROLAC TROMETHAMINE 30 MG: 30 INJECTION, SOLUTION INTRAMUSCULAR at 06:39

## 2018-04-30 RX ADMIN — OXYCODONE HYDROCHLORIDE 10 MG: 5 TABLET ORAL at 18:54

## 2018-04-30 RX ADMIN — ACETAMINOPHEN 975 MG: 325 TABLET ORAL at 06:39

## 2018-04-30 RX ADMIN — SENNOSIDES AND DOCUSATE SODIUM 1 TABLET: 8.6; 5 TABLET ORAL at 20:14

## 2018-04-30 NOTE — LACTATION NOTE
"D:  I met with Echo in her postpartum room today.  Ten is her sixth baby, she  her others 1-2 months each said she then \"dried up\".  She reports back/hip issues she treats with chiropractic, no meds and no history of breast/chest surgery or trauma.  She has already started to pump.    I:  I gave her a folder of introductory materials and went over pumping guidelines.    We talked about hands on pumping techniques, hand expression and how to access the [employer] websites. I told her we could help her obtain a pump to use at discharge.  A:  Mom has information she needs to work on her milk supply.  P:  Will continue to provide lactation support.       Kellen Ramirez, RNC, IBCLC   "

## 2018-04-30 NOTE — PROGRESS NOTES
OB Progress Note  Echo Lea  [id number]    Subjective: Pt doing well with some complaints of pain with gruber catheter and incisional pain with movement.  Also with gas bubble cramping.  Lochia is appropriately decreasing. Pt has not yet stood or ambulated yet.  Gruber catheter in place.  Tolerating minor PO intake without difficulty. Not yet passing flatus. Breastpumping and bottle feeding.     Objective:   Patient Vitals for the past 12 hrs:   BP Temp Temp src Pulse Heart Rate Resp SpO2   18 0831 - 98.2  F (36.8  C) Oral - - - 100 %   18 0635 93/47 98.4  F (36.9  C) Oral 58 - 18 98 %   18 0230 103/53 98.8  F (37.1  C) Oral 69 - 18 99 %   18 0130 101/62 98.7  F (37.1  C) Oral 67 - 18 99 %   18 0030 93/53 98.8  F (37.1  C) Oral 68 - 18 99 %   18 2330 117/70 98.1  F (36.7  C) Oral 72 - 18 99 %   18 2230 129/70 98.4  F (36.9  C) Oral 72 - 18 99 %   18 2200 135/70 98.1  F (36.7  C) Oral - 62 18 97 %   18 2100 131/76 - - - - - 99 %   185 131/76 - - - - 16 100 %   18 2040 140/75 - - - 52 13 100 %   ]    Gen: lying on side in bed, in NAD  CV: RRR, no murmurs  Pulm: CTAB, no r/r/w  Abd: soft, non-distended, uterine fundus firm and inferior to umbilicus 2-, appropriately tender  Incision: c/d/i, bandage in place without shadowing  Extr: trace edema    I/O last 3 completed shifts:  In: 2584.58 [P.O.:220; I.V.:2364.58]  Out: 3272 [Urine:1600; Blood:1672]    Hemoglobin   Date Value Ref Range Status   2018 8.7 (L) 11.7 - 15.7 g/dL Final   2018 10.7 (L) 11.7 - 15.7 g/dL Final   ]    Assessment and Plan: Echo Lea 43 year old   POD#1 s/p PLTCS for heavy vaginal bleeding in setting of chronic abruption with polyhydramnios and suspected T21 of infant.  Patient is doing well and meeting post-op delivery goals.     Routine post-partum care:  - Pain - moderately controlled on PO pain meds  - Hb 10.7>EBL 1672mL >8.7 Acute blood loss anemia  from CS.  Pt asymptomatic. No concerns. PO iron on discharge as Hgb<10.  - FEN/GI: regular diet, continue bowel regimen  - : gruber in place - remove this am and with trial of void, adequate UOP, no concerns  - Rubella immune - no MMR needed; Rh positive - no rhogam needed  - Contraception: will discuss prior to DC  - Baby: doing well, breastpumping and bottlefeeding as baby in NICU    # Pain Assessment:  Current Pain Score 4/30/2018   Patient currently in pain? yes   Pain location Incision   Pain descriptors Burning   - Echo is experiencing pain due to incisional pain. Pain management was discussed and the plan was created in a collaborative fashion.  Echo's response to the current recommendations: engaged  - Opioid regimen: oxycodone 5-10mg Q4hr PRN  - Response to opioid medications: Reduction of symptoms   - Bowel regimen: senna   - Adjuvant meds: ibuprofen and tylenol    - Dispo to home POD#3 when meeting all post-operative goals.    Janee Perkins MD, MPH  PGY3, OB/GYN  Pager: [phone]  4/30/2018    I personally examined and evaluated Echo Lea on 4/30/2018.  I discussed the patient with Dr. Perkins and agree with the presentation, exam and plan of care documented in this note with edits by me.  Exam appropriate for POD#1, d/c gruber, rec ambulation.  Lorraine Viramontes MD MPH

## 2018-04-30 NOTE — PLAN OF CARE
Problem: Patient Care Overview  Goal: Plan of Care/Patient Progress Review  Data: Vital signs within normal limits. Postpartum checks within normal limits - see flow record. Patient eating and drinking normally. Alberts DC'd at 1315, no void yet. Up OOB, ambulated short distance in room.  No apparent signs of infection. Incisional dressing C/D/I.  Down to NICU by WC to see baby once this shift.  Action: Patient medicated during the shift for pain. See MAR. Patient reassessed within 1 hour after each medication and pain was improved - patient stated she was comfortable. Patient education done, see flow record.  Response: Support person (FOB) present.

## 2018-04-30 NOTE — PROVIDER NOTIFICATION
04/29/18 2245   Provider Notification   Provider Name/Title Dr. Perkins   Method of Notification Phone   Request Evaluate-Remote   Notification Reason Other     Pt c/o incisonal pain, requesting dilaudid. MD to place order.

## 2018-04-30 NOTE — PLAN OF CARE
Problem: Patient Care Overview  Goal: Plan of Care/Patient Progress Review  Outcome: Improving  Patient arrived to [facility] unit via zoom cart at 2150,with belongings, accompanied by spouse/ significant other. Infant in NICU. Received report from SAMANTHA Jenkins. Unit and room orientation started. Call light given; no concerns present at this time. Continue with plan of care.

## 2018-04-30 NOTE — ANESTHESIA CARE TRANSFER NOTE
Patient: Echo Lea    Procedure(s):   - Wound Class: II-Clean Contaminated    Diagnosis: pregnancy  Diagnosis Additional Information: No value filed.    Anesthesia Type:   Spinal     Note:  Airway :Face Mask  Patient transferred to:PACU  Comments: .Anesthesia Care Transfer Note    Patient: Echo Lea    Transferred to: PACU    Patient vital signs: stable    Airway: none    Monitors applied, VSS.  Patient awake and comfortable, breathing spontaneously.  Report given to RN with transfer of care.        Narda Villalobos CRNA  4/29/2018  7:01 PM    Handoff Report: Identifed the Patient, Identified the Reponsible Provider, Reviewed the pertinent medical history, Discussed the surgical course, Reviewed Intra-OP anesthesia mangement and issues during anesthesia, Set expectations for post-procedure period and Allowed opportunity for questions and acknowledgement of understanding      Vitals: (Last set prior to Anesthesia Care Transfer)    CRNA VITALS  4/29/2018 1823 - 4/29/2018 1900      4/29/2018             Resp Rate (observed): (!)  1                Electronically Signed By: PUNEET Ledbetter CRNA  April 29, 2018  7:00 PM

## 2018-04-30 NOTE — PLAN OF CARE
Problem: Patient Care Overview  Goal: Plan of Care/Patient Progress Review  Outcome: Improving  Data: Vital signs within normal limits. Postpartum checks within normal limits - see flow record. Patient drinking water and juice, able to tolerate saltine crackers. Alberts in place, adequate urine output overnight. No apparent signs of infection. Dressing on incision clean, dry and intact. Pt requested to delay pumping until AM. Discussed importance of stimulating for milk supply.  Action: Patient medicated during the shift for pain. See MAR. Patient reassessed within 1 hour after each medication. Pt stated no significant change in pain after medication. Encouraged pt to take oxycodone on top of tylenol and toradol. Pt stated hesitation, but agreed to trial medication. Unable to get pt up marching at bedside due to inadequate pain control. Discussed starting PCA as alternate pain management strategy, pt also hesitant about use of dilaudid. Will continue to monitor and give medication as available. Patient education done about room orientation,  care, and NICU. See flow record.  Response: Visited NICU prior to transfer, will go see  once stable.Support person present.   Plan: Anticipate discharge on 18.

## 2018-04-30 NOTE — PLAN OF CARE
Problem: Patient Care Overview  Goal: Plan of Care/Patient Progress Review  Outcome: Improving  Data: Echo Lea transferred to [place] [id number] via cart at 2110. Stopped in NICU during transfer to visit baby boy. Questions answered in NICU regarding care of .   Action: Receiving unit notified of transfer: Yes. Patient and family notified of room change. Report given to Haley Martinez RN at 2200. Belongings sent to receiving unit. Accompanied by Registered Nurse. Oriented patient to surroundings. Call light within reach.  Response: Patient tolerated transfer and is stable.

## 2018-04-30 NOTE — DISCHARGE SUMMARY
"[facility] Discharge Summary    Echo Lea MRN# [id number]   Age: 43 year old YOB: 1975     Date of Admission:  2018  Date of Discharge:  5/3/2018   Admitting Physician:  Gian Esposito MD  Discharge Physician:  Gypsy Roberts MD    Admit Dx:   1. AMA  2. IUP at 35w0d  3. High suspicion for Trisomy 21 of fetus, based on US markers, with VSD x3, positive NIPT  4. Polyhydramnios (PHYLLIS 40) diagnosed on 4/10 US  5. Chronic abruption, with now heavy vaginal bleeding    Discharge Dx:  - Same as above, s/p primary low transverse  section  - primary    Procedures:  - Primary low transverse  section with double layer uterine closure via modified Francisco Cohenincision  - General anesthesia    Admit HPI:  Echo Lea  is a 43 year old  at 35w0d by 22w2d US who presents via transfer from [place]. Echo's pregnancy is complicated by suspected fetal Trisomy 21 by ultrasound findings (multiple VSD, thick nuchal fold, sandal gap), as well as Innatal positive for Trisomy 21. She also has polyhydramnios.   Yesterday around 1100 she was driving to the grocery store when she had a huge gush of blood, filling her pants and \"flooding the car seat,\" so she went immediately to the hospital. The bleeding stopped soon after she past \"a few\" golf ball sized clots. Prior to this she felt crampy and uncomfortable (beyond her baseline) but after passing the clots felt a little better. While inpatient, she had a single golf ball sized clot at 0200 but since then has only had pink spotting on her pads. She feels fetal movement is more than normal. She does not feel dizzy or lightheaded. She does occasionally feel contractions that make her grimace.    She denies dysuria, HA, vision changes, chest pain, SOB, or swelling in her extremities.   Please see her admit H&P for full details of her PMH, PSH, Meds, Allergies and exam on admit.    Operative Course:  Surgery was " uncomplicated. EBL from the delivery was 1672mL.   Please see her  Section Operative Note for full details regarding her delivery.    Operative Findings:  1. No intraabdominal adhesions  2. Thickly bloody amniotic fluid  3. Livebornmale infant in flotaing cephalic presentation. Apgars pending.   4. Arterial cord pH 7.36, base deficit 0.0. Venous cord pH 7.36, base deficit 0.4.  5. Normal uterus, fallopian tubes, and ovaries.        Postoperative Course:  Her postoperative course was uncomplicated . On POD#3, she was meeting all of her postpartum goals and deemed stable for discharge. She was voiding without difficulty, tolerating a regular diet without nausea and vomiting, her pain was well controlled on oral pain medicines and her lochia was appropriate. Her hemoglobin prior to delivery was 10.7 and after delivery was 8.7. Her Rh status was positive and Rhogam was not indicated.    Discharge Medications:    Echo Lea   Home Medication Instructions EARNEST:[id number]    Printed on:18 2856   Medication Information                      acetaminophen (TYLENOL) 325 MG tablet  Take 2 tablets (650 mg) by mouth every 4 hours as needed for other (multimodal surgical pain management along with NSAIDS and opioid medication as indicated based on pain control and physical function.)             ferrous sulfate (IRON) 325 (65 Fe) MG tablet  Take 1 tablet (325 mg) by mouth daily (with breakfast)             ibuprofen (ADVIL/MOTRIN) 600 MG tablet  Take 1 tablet (600 mg) by mouth every 6 hours as needed for other (carmping)             oxyCODONE IR (ROXICODONE) 5 MG tablet  Take 1 tablet (5 mg) by mouth every 6 hours as needed for moderate to severe pain             Prenatal Vit-Fe Fumarate-FA (PRENATAL MULTIVITAMIN PLUS IRON) 27-0.8 MG TABS per tablet  Take 1 tablet by mouth daily             senna-docusate (SENOKOT-S;PERICOLACE) 8.6-50 MG per tablet  Take 1 tablet by mouth 2 times daily as needed for  constipation                 Discharge/Disposition:  Echo Lea was discharged to home in stable condition with the following instructions/medications:  1) Call for temperature > 100.4, bright red vaginal bleeding >1 pad an hour x 2 hours, foul smelling vaginal discharge, pain not controlled by usual oral pain meds, persistent nausea and vomiting not controlled on medications, drainage or redness from incision site  2) She desired Paragard for contraception.  3) For feeding she decided to breastfeed.  4) She was instructed to follow-up with her primary OB in 6 weeks for a routine postpartum visit.  5) Discharge activity:  No heavy lifting >15 lbs or strenuous activity for 6 weeks, pelvic rest for 6 weeks, no driving or operating machinery while on narcotics.      Jazzmine Marquez PGY3  5/3/2018 6:48 AM     I have seen, examined and counseled the patient on the day of discharge. Discussed post operative instructions and medications. I have reviewed and edited the note.   Gypsy Roberts

## 2018-04-30 NOTE — ANESTHESIA POSTPROCEDURE EVALUATION
Patient: Echo Lea    Procedure(s):   - Wound Class: II-Clean Contaminated    Diagnosis:pregnancy  Diagnosis Additional Information: No value filed.    Anesthesia Type:  Spinal    Note:  Anesthesia Post Evaluation    Patient location during evaluation: PACU  Patient participation: Able to participate in evaluation but full recovery from regional anesthesia has not yet ocurrred but is anticipated to occur within 48 hours  Level of consciousness: awake and alert  Pain management: adequate  Airway patency: patent  Cardiovascular status: acceptable  Respiratory status: acceptable  Hydration status: balanced  PONV: none     Anesthetic complications: None          Last vitals:  Vitals:    04/29/18 2040 04/29/18 2055 04/29/18 2100   BP: 140/75 131/76 131/76   Pulse:      Resp: 13 16    Temp:      SpO2: 100% 100% 99%         Electronically Signed By: Fidelia Cm MD  April 29, 2018  9:34 PM

## 2018-04-30 NOTE — ANESTHESIA PROCEDURE NOTES
Peripheral Nerve Block Procedure Note    Staff:     Anesthesiologist:  BISHOP CAR  Location: PACU  Procedure Start/Stop TImes:      4/29/2018 7:45 PM     4/29/2018 7:55 PM    patient identified, IV checked, site marked, risks and benefits discussed, informed consent, monitors and equipment checked, pre-op evaluation, at physician/surgeon's request and post-op pain management      Correct Patient: Yes      Correct Position: Yes      Correct Site: Yes      Correct Procedure: Yes      Correct Laterality:  Yes    Site Marked:  Yes  Procedure details:     Procedure:  TAP    ASA:  3    Diagnosis:  C/S post op pain.    Laterality:  Bilateral    Position:  Supine    Sterile Prep: chloraprep      Needle:  Insulated    Needle gauge:  20    Needle length (inches):  4    Ultrasound: Yes      Ultrasound used to identify targeted nerve, plexus, or vascular structure and placed a needle adjacent to it      Permanent Image entered into patiient's record      Abnormal pain on injection: No      Blood Aspirated: No      Paresthesias:  No    Bleeding at site: No      Bolus via:  Needle  Assessment/Narrative:     Volume per aspiration (mL): every 5 ml negative aspiration.     No issues with placement.

## 2018-04-30 NOTE — PLAN OF CARE
"Problem: Patient Care Overview  Goal: Plan of Care/Patient Progress Review  Outcome: Improving  Delivery via  section of baby at 1750 with placenta out at 1751. Due to difficulty of placing spinal catheter, decision to proceed with general anesthesia. No complications. Surgical QBL 1591 ml. Recovery room cares provided. Significant pain expressed per patient in PACU - treated with fentanyl, dilaudid, and TAP block per recommendations/orders of Dr. Cm. Patient now c/o minimal incisional burning but states \"it is better than before.\" Stable to transfer to postpartum.       "

## 2018-05-01 PROCEDURE — 12000028 ZZH R&B OB UMMC

## 2018-05-01 PROCEDURE — 25000132 ZZH RX MED GY IP 250 OP 250 PS 637: Performed by: INTERNAL MEDICINE

## 2018-05-01 RX ORDER — ACETAMINOPHEN 325 MG/1
650 TABLET ORAL EVERY 4 HOURS PRN
Qty: 60 TABLET | Refills: 0 | Status: SHIPPED | OUTPATIENT
Start: 2018-05-02

## 2018-05-01 RX ORDER — IBUPROFEN 600 MG/1
600 TABLET, FILM COATED ORAL EVERY 6 HOURS PRN
Qty: 30 TABLET | Refills: 0 | Status: SHIPPED | OUTPATIENT
Start: 2018-05-01

## 2018-05-01 RX ORDER — AMOXICILLIN 250 MG
1 CAPSULE ORAL 2 TIMES DAILY PRN
Qty: 40 TABLET | Refills: 0 | Status: SHIPPED | OUTPATIENT
Start: 2018-05-01

## 2018-05-01 RX ORDER — FERROUS SULFATE 325(65) MG
325 TABLET ORAL
Qty: 30 TABLET | Refills: 0 | Status: SHIPPED | OUTPATIENT
Start: 2018-05-01

## 2018-05-01 RX ORDER — OXYCODONE HYDROCHLORIDE 5 MG/1
5 TABLET ORAL EVERY 6 HOURS PRN
Qty: 36 TABLET | Refills: 0 | Status: SHIPPED | OUTPATIENT
Start: 2018-05-01

## 2018-05-01 RX ADMIN — OXYCODONE HYDROCHLORIDE 5 MG: 5 TABLET ORAL at 16:07

## 2018-05-01 RX ADMIN — OXYCODONE HYDROCHLORIDE 10 MG: 5 TABLET ORAL at 22:32

## 2018-05-01 RX ADMIN — IBUPROFEN 800 MG: 400 TABLET ORAL at 07:02

## 2018-05-01 RX ADMIN — SENNOSIDES AND DOCUSATE SODIUM 2 TABLET: 8.6; 5 TABLET ORAL at 08:01

## 2018-05-01 RX ADMIN — OXYCODONE HYDROCHLORIDE 10 MG: 5 TABLET ORAL at 00:10

## 2018-05-01 RX ADMIN — ACETAMINOPHEN 975 MG: 325 TABLET ORAL at 00:10

## 2018-05-01 RX ADMIN — ACETAMINOPHEN 975 MG: 325 TABLET ORAL at 16:06

## 2018-05-01 RX ADMIN — IBUPROFEN 800 MG: 400 TABLET ORAL at 13:12

## 2018-05-01 RX ADMIN — BISACODYL 10 MG: 10 SUPPOSITORY RECTAL at 22:36

## 2018-05-01 RX ADMIN — OXYCODONE HYDROCHLORIDE 10 MG: 5 TABLET ORAL at 02:54

## 2018-05-01 RX ADMIN — ACETAMINOPHEN 975 MG: 325 TABLET ORAL at 08:01

## 2018-05-01 RX ADMIN — IBUPROFEN 800 MG: 400 TABLET ORAL at 00:10

## 2018-05-01 RX ADMIN — OXYCODONE HYDROCHLORIDE 10 MG: 5 TABLET ORAL at 07:02

## 2018-05-01 RX ADMIN — IBUPROFEN 800 MG: 400 TABLET ORAL at 19:03

## 2018-05-01 RX ADMIN — SENNOSIDES AND DOCUSATE SODIUM 2 TABLET: 8.6; 5 TABLET ORAL at 19:45

## 2018-05-01 RX ADMIN — OXYCODONE HYDROCHLORIDE 10 MG: 5 TABLET ORAL at 19:02

## 2018-05-01 NOTE — CONSULTS
"[facility] [place]  MATERNAL CHILD HEALTH   SOCIAL WORK PROGRESS NOTE      DATA:     Received order due to NICU admission. Echo is a 43 year old . She gave birth to baby boy Ten Lea on 18 via c/s. Ten was admitted to the NICU d/t suspected Trisomy 21, cyanosis and VSDs. FOB/, Daren was bedside. Ten is 2nd baby for him. Couple also have a 2 year old together. Echo has 4 older children with her ex  (ages 13, 21, 22, and 25). Her older son is caring for their 2 year old while parents are at the hospital. Parents currently resides in [place], [place] together (approximately 50 minutes outside of the [facility]). Echo has -[facility]. She plans to add baby to this. She has some baby items. However, was hoping to get more items at her baby shower scheduled on Saturday, but that was cancelled. Parents are both self employed.     They are interested in additional community resources as they do worry about possible financial stress related to Ten's medical needs. Initially, Echo did not want to discuss these resources as the confirmatory testing had not been received. However, she then stated \"we know he has Down Syndrome.\" She mentioned Mark's basket has been discussed with her prenatally, this writer made a referral. This writer also discussed SSI and offered to leave additional resources bedside prior to Ten's discharge, which parents were open to.     Parents are coping okay. They discussed the stress associated with Echo's  delivery. She identified the difficulty recovering from a c/s, as her previous 5 deliveries were vaginal. Daren also discussed how his truck broke down when he was driving to the hospital, it has since been fixed. Parents are eager for Ten to return home. However, are understanding of why he needs to be in the hospital.    Echo reports a stable mood during this pregnancy. She denied any history of PPD, depression, anxiety, " "etc. Parents identified having \"excellent support\" and have \"hundreds of people praying\" for them. They are feeling well supported by the medical team. They are interested in transitioning into a boarding room when Echo is discharged. They denied having any questions or concerns at this time.     INTERVENTION:     This  reviewed the chart and coordinated with the health care team. This  introduced myself and my role as their Maternal-Child Health , including role and scope of practice. I met with the family today to assess for needs, offer support, assess for coping and review hospital and community resources. Provided supportive counseling related to NICU admission. Shared information on parking, boarding rooms, parent badges. Validated and normalized expressed emotions. Provided emotional support and active listening. Provided psychoeducation about postpartum mood and anxiety disorders, including symptoms and risk factors associated. Offered patient Pregnancy & Postpartum Support of [place] resource. Made Jack[employer] referral. Discussed SSI. Mentioned Early Intervention. Provided patient with this writer's contact information and encouraged family to access this writer as needed.     ASSESSMENT:     Parents appear to be coping adequately to this hospitalization. Parents easily engage and are able to verbally express themselves and identify needs. Mom more engaged than dad. Both parents appear understandably tired. Support system appears good. Parents appreciative and receptive to social work visit. Parents do seem to have an understanding of Vance's medical needs and are verbalizing his diagnosis of Down Syndrome. They seemed receptive to resources discussed. No unmet needs at this time.  Parents are aware of social work support and availability.     PLAN:     Social work will continue to assess needs and provide ongoing psychosocial support and access to resources. "       JANNY Merino, [facility]   Social Worker  Maternal Child Health   Phone: [phone]  Pager: [phone]

## 2018-05-01 NOTE — PLAN OF CARE
Problem: Patient Care Overview  Goal: Plan of Care/Patient Progress Review  Outcome: Improving  Data: Vital signs within normal limits. Postpartum checks within normal limits - see flow record. Patient eating and drinking normally. Patient able to empty bladder independently and is up ambulating. No apparent signs of infection. Incision healing well. Patient performing self cares and is able to pump for infant.  Action: Patient medicated during the shift for pain. See MAR. Patient reassessed within 1 hour after each medication and pain was improved - patient stated she was comfortable. Patient education done about pain management, pumping and self cares. See flow record.  Response: Pt down to the NICU to visit infant. Support persons family and spouse present.   Plan: Anticipate discharge on 5/2.

## 2018-05-01 NOTE — PROGRESS NOTES
OB Progress Note  Echo Lea  [id number]    Subjective: Pt doing well with improved pain control overnight.  Stating some pulling pain with movement. Lochia is appropriately decreasing.  Ambulating around room mostly.  Tolerating PO intake without difficulty. Passing flatus . Breastpumping and bottle feeding.     Objective:   Patient Vitals for the past 12 hrs:   BP Temp Temp src Pulse Resp   18 0757 90/41 98.7  F (37.1  C) Oral 69 16   18 0009 90/46 98.6  F (37  C) Oral 78 16   ]    Gen: lying on side in bed, in NAD  CV: RRR, no murmurs  Pulm: CTAB, no r/r/w  Abd: soft, non-distended, uterine fundus firm and inferior to umbilicus 2-, appropriately tender  Incision: c/d/i, steri-strips in place  Extr: trace edema    I/O last 3 completed shifts:  In: -   Out: 1000 [Urine:1000]    Hemoglobin   Date Value Ref Range Status   2018 8.7 (L) 11.7 - 15.7 g/dL Final   2018 10.7 (L) 11.7 - 15.7 g/dL Final   ]    Assessment and Plan: Echo Lea 43 year old   POD#2 s/p PLTCS for heavy vaginal bleeding in setting of chronic abruption with polyhydramnios and suspected T21 of infant.  Patient is doing well and meeting post-op delivery goals. Working on pain control today.     Routine post-partum care:  - Pain - moderately controlled on PO pain meds  - Hb 10.7>EBL 1672mL >8.7 Acute blood loss anemia from CS.  Pt asymptomatic. No concerns. PO iron on discharge as Hgb<10.  - FEN/GI: regular diet, continue bowel regimen  - :s/p gruber, voiding spont. adequate UOP, no concerns  - Rubella immune - no MMR needed; Rh positive - no rhogam needed  - Contraception: planning paragard  - Baby: doing well, breastpumping and bottlefeeding as baby in NICU    # Pain Assessment:  Current Pain Score 2018   Patient currently in pain? yes   Pain location Incision   Pain descriptors Sore   - Echo is experiencing pain due to incisional pain. Pain management was discussed and the plan was created in a  collaborative fashion.  Echo's response to the current recommendations: engaged  - Opioid regimen: oxycodone 5-10mg Q4hr PRN  - Response to opioid medications: Reduction of symptoms   - Bowel regimen: senna   - Adjuvant meds: ibuprofen and tylenol    - Dispo to home POD#3 when meeting all post-operative goals.    Janee Perkins MD, MPH  PGY3, OB/GYN  Pager: [phone]  5/1/2018  OB/GYN Staff -- Pt seen and examined by me. Agree with note as above.  MD Jessie

## 2018-05-01 NOTE — PLAN OF CARE
Problem: Patient Care Overview  Goal: Plan of Care/Patient Progress Review  Outcome: Improving  Pain meds given for incisional pain, relief obtained. Pumping independently while awake. Steri strips C/D/I. Vss

## 2018-05-01 NOTE — PLAN OF CARE
Problem: Postpartum ( Delivery) (Adult,Obstetrics,Pediatric)  Goal: Signs and Symptoms of Listed Potential Problems Will be Absent, Minimized or Managed (Postpartum)  Signs and symptoms of listed potential problems will be absent, minimized or managed by discharge/transition of care (reference Postpartum ( Delivery) (Adult,Obstetrics,Pediatric) CPG).   Outcome: Improving  Data: Vital signs within normal limits. Postpartum checks within normal limits - see flow record. Patient eating and drinking normally. Patient able to empty bladder independently and is up ambulating. No apparent signs of infection. Incision healing well. Patient performing self cares and is able to go to NICU to see baby.  Action: Patient medicated during the shift for pain. See MAR. Patient reassessed within 1 hour after each medication and pain was improved - patient stated she was comfortable. Patient education done, see flow record.

## 2018-05-02 PROCEDURE — 12000028 ZZH R&B OB UMMC

## 2018-05-02 PROCEDURE — 25000132 ZZH RX MED GY IP 250 OP 250 PS 637: Performed by: INTERNAL MEDICINE

## 2018-05-02 RX ADMIN — OXYCODONE HYDROCHLORIDE 5 MG: 5 TABLET ORAL at 19:29

## 2018-05-02 RX ADMIN — SENNOSIDES AND DOCUSATE SODIUM 1 TABLET: 8.6; 5 TABLET ORAL at 08:33

## 2018-05-02 RX ADMIN — IBUPROFEN 800 MG: 400 TABLET ORAL at 08:33

## 2018-05-02 RX ADMIN — OXYCODONE HYDROCHLORIDE 5 MG: 5 TABLET ORAL at 15:08

## 2018-05-02 RX ADMIN — OXYCODONE HYDROCHLORIDE 5 MG: 5 TABLET ORAL at 04:52

## 2018-05-02 RX ADMIN — IBUPROFEN 800 MG: 400 TABLET ORAL at 00:52

## 2018-05-02 RX ADMIN — ACETAMINOPHEN 975 MG: 325 TABLET ORAL at 00:52

## 2018-05-02 RX ADMIN — OXYCODONE HYDROCHLORIDE 5 MG: 5 TABLET ORAL at 08:33

## 2018-05-02 RX ADMIN — IBUPROFEN 800 MG: 400 TABLET ORAL at 15:08

## 2018-05-02 RX ADMIN — ACETAMINOPHEN 650 MG: 325 TABLET ORAL at 22:57

## 2018-05-02 RX ADMIN — OXYCODONE HYDROCHLORIDE 5 MG: 5 TABLET ORAL at 22:57

## 2018-05-02 RX ADMIN — IBUPROFEN 800 MG: 400 TABLET ORAL at 22:10

## 2018-05-02 RX ADMIN — ACETAMINOPHEN 975 MG: 325 TABLET ORAL at 15:08

## 2018-05-02 NOTE — PLAN OF CARE
Problem: Patient Care Overview  Goal: Plan of Care/Patient Progress Review  Outcome: Improving  Vitals are stable, pumping on demand, voiding without difficulty. Have been up to visit baby in the NICU. Continue with plan of care.

## 2018-05-02 NOTE — PLAN OF CARE
Problem: Patient Care Overview  Goal: Plan of Care/Patient Progress Review  Outcome: Improving  Pain meds given for incisional pain, relief obtained. Small bowel movement reported. Fundus firm and midline, U/U. Pumping independently. Vss

## 2018-05-02 NOTE — PLAN OF CARE
Problem: Patient Care Overview  Goal: Plan of Care/Patient Progress Review  Outcome: Improving  States incisional discomfort adequately managed with regular tylenol, ibuprofen, and oxicodone. Ambulating with encouragement.  Tolerating regular diet without nausea, passing flatus but no BM yet.  Suppository given, no results yet. Using breast pump independently, yielding drops which she is taking to infant in NICU.  present, supportive and helpful.

## 2018-05-02 NOTE — PROGRESS NOTES
OB Progress Note  Echo Lea  [id number]    Subjective: Pt doing well however with increased pain last night.  She states she tried to get by on tylenol and ibuprofen, but then got behind on her pain and having significant pain.  Ipmroving with oxycodone this am and she is able to rest.  Also with some gas pain. Lochia is appropriately decreasing.  Ambulating. Tolerating PO intake without difficulty. Passing flatus . Breastpumping and bottle feeding.     Objective:   Patient Vitals for the past 12 hrs:   BP Temp Temp src Heart Rate Resp   18 0051 111/67 97.9  F (36.6  C) Oral 81 18   ]    Gen: lying on side in bed, in NAD, partner in bed with patient  CV: regular rate  Pulm: breathing comfortably on room air  Abd: soft, mildly-distended, uterine fundus firm and inferior to umbilicus 2-, appropriately tender  Incision: c/d/i, steri-strips in place  Extr: trace edema         Hemoglobin   Date Value Ref Range Status   2018 8.7 (L) 11.7 - 15.7 g/dL Final   2018 10.7 (L) 11.7 - 15.7 g/dL Final   ]    Assessment and Plan: Echo Lea 43 year old   POD#3 s/p PLTCS for heavy vaginal bleeding in setting of chronic abruption with polyhydramnios and suspected T21 of infant.  Patient is doing well and meeting post-op delivery goals. Working on pain control today and likely discharge to boarding status today.     Routine post-partum care:  - Pain - moderately controlled on PO pain meds  - Hb 10.7>EBL 1672mL >8.7 Acute blood loss anemia from CS.  Pt asymptomatic. No concerns. PO iron on discharge as Hgb<10.  - FEN/GI: regular diet, continue bowel regimen  - :s/p gruber, voiding spont. adequate UOP, no concerns  - Rubella immune - no MMR needed; Rh positive - no rhogam needed  - Contraception: planning paragard  - Baby: doing well, breastpumping and bottlefeeding as baby in NICU    # Pain Assessment:  Current Pain Score 2018   Patient currently in pain? sleeping: patient not able to self report    Pain location -   Pain descriptors -   - Echo is experiencing pain due to incisional pain. Pain management was discussed and the plan was created in a collaborative fashion.  Echo's response to the current recommendations: engaged  - Opioid regimen: oxycodone 5-10mg Q4hr PRN  - Response to opioid medications: Reduction of symptoms   - Bowel regimen: senna   - Adjuvant meds: ibuprofen and tylenol    - Dispo to boarding room status POD#3 when meeting all post-operative goals.    Janee Perkins MD, MPH  PGY3, OB/GYN  Pager: [phone]  5/2/2018    [facility] staff:  Appreciate note by Dr. Perkins.  I have seen and examined the patient without the resident. I have reviewed, edited, and agree with the note.      Debi Wilkinson MD  5/2/2018  9:10 AM

## 2018-05-02 NOTE — PROGRESS NOTES
"Assumed care of patient at 0400.  Medicated for incisional pain with oxycodone; stated she was trying to be \"brave\" but couldn't take it any longer.  Sleeping off and on overnight and did not pump.  Possible discharge today.  Baby in NICU on 11th floor.  "

## 2018-05-03 VITALS
WEIGHT: 156 LBS | HEART RATE: 69 BPM | RESPIRATION RATE: 16 BRPM | HEIGHT: 65 IN | TEMPERATURE: 97.8 F | OXYGEN SATURATION: 98 % | DIASTOLIC BLOOD PRESSURE: 75 MMHG | BODY MASS INDEX: 25.99 KG/M2 | SYSTOLIC BLOOD PRESSURE: 106 MMHG

## 2018-05-03 LAB — COPATH REPORT: NORMAL

## 2018-05-03 PROCEDURE — 25000132 ZZH RX MED GY IP 250 OP 250 PS 637: Performed by: INTERNAL MEDICINE

## 2018-05-03 RX ADMIN — ACETAMINOPHEN 650 MG: 325 TABLET ORAL at 07:08

## 2018-05-03 RX ADMIN — OXYCODONE HYDROCHLORIDE 5 MG: 5 TABLET ORAL at 02:55

## 2018-05-03 RX ADMIN — IBUPROFEN 800 MG: 400 TABLET ORAL at 07:50

## 2018-05-03 RX ADMIN — OXYCODONE HYDROCHLORIDE 5 MG: 5 TABLET ORAL at 07:07

## 2018-05-03 RX ADMIN — ACETAMINOPHEN 650 MG: 325 TABLET ORAL at 02:55

## 2018-05-03 NOTE — PLAN OF CARE
Problem: Patient Care Overview  Goal: Plan of Care/Patient Progress Review  Outcome: Improving  Data: Vital signs within normal limits. Postpartum checks within normal limits - see flow record. Patient eating and drinking normally. Patient able to empty bladder independently and is up ambulating. No apparent signs of infection. Incision healing well. Patient performing self cares and is able to care for infant.  Action: Patient medicated during the shift for pain and cramping. See MAR. Patient reassessed within 1 hour after each medication and pain was improved - patient stated she was comfortable. Patient education done about discharge to home. See flow record.  Response: Positive attachment behaviors observed with infant. Support persons significant other present.   Plan: Anticipate discharge today.

## 2018-05-03 NOTE — DISCHARGE INSTRUCTIONS

## 2018-05-03 NOTE — PLAN OF CARE
Problem: Patient Care Overview  Goal: Plan of Care/Patient Progress Review  Outcome: Therapy, progress toward functional goals as expected  Data: Vital signs within normal limits. Postpartum checks within normal limits - see flow record. Patient eating and drinking normally. Patient able to empty bladder independently and is up ambulating. No apparent signs of infection. Incision healing well. Patient performing self cares and is pumping independently.   Action: Patient medicated during the shift for pain. See MAR. Patient reassessed within 1 hour after each medication and pain was improved - patient stated she was comfortable.   Response: Positive visited infant in NICU during evening hours.   Plan: Anticipate discharge on 5/3.

## 2018-05-03 NOTE — PROGRESS NOTES
Post Partum Progress Note    Subjective:  Patient is doing well this morning, she was sleeping when I came into the room. She has pain when she moves but was able to sleep all night as long as she didn't move very much. The pain medication is helping. Moderate lochia. Tolerating PO and ambulating without any issues.  Denies any fever, chills, SOB, chest pain, N/V,  headache, dizziness.  She is pumping and plans to continue with this and breast feed when able.  Would like Paragard for birth control.    Objective:  Patient Vitals for the past 24 hrs:   BP Temp Temp src Pulse Heart Rate Resp   18 0254 127/74 98.9  F (37.2  C) Oral - 65 16   18 2205 115/73 97.7  F (36.5  C) Axillary - 64 16   18 1557 104/64 98.8  F (37.1  C) Oral 69 - 18   18 0833 116/53 98.4  F (36.9  C) Oral 77 - 16       General: sleeping comfortably, no acute distress  Resp:  nonlabored breathing  Abd:  Soft, nondistended, appropriately tender across abdomen. Fundus firm at approximately hand-width below umbilicus. Incision clean and dry. Steri strips in place with minimal shadowing.  Ext:  Trace edema in bilateral LE      Assessment and Plan:  43 year old old  POD#3 s/p PLTCS for heavy vaginal bleeding in setting of chronic abruption with polyhydramnios and suspected T21 of infant. Patient is doing well in the postoperative period.    -Pain: moderately controlled on PO meds  -Acute blood loss anemia, expected, secondary to . Hgb 10.7 > EBL 1672 mL > 8.7. Asymptomatic. Will d/c to home with PO Fe  -GI: regular diet, continue bowel regimen  -: s/p gruber, good UOP  -Rubella immune, Rh positive  -Contraception: Paragard at 6w PP visit  -Feeding: breast feeding/pumping  -Infant: Doing well, still in NICU    Anticipate d/c to boarding status today    # Pain Assessment:  Current Pain Score 5/3/2018   Patient currently in pain? yes   Pain location Incision   Pain descriptors Sore   - Echo is experiencing pain due to  recent . Pain management was discussed and the plan was created in a collaborative fashion.  Echo's response to the current recommendations: engaged  - Opioid regimen: oxycodone, 5-10 mg q4h PRN  - Response to opioid medications: Reduction of symptoms   - Bowel regimen: senna and docusate  - Pharmacologic adjuvants: NSAIDs and Acetaminophen  - Non-pharmacologic adjuvants: Heat and Ice    Rosalie Ross MD  Obstetrics and Gyncology, PGY-1  May 3, 2018 , 7:13 AM      I have seen and examined the patient without the resident. I have reviewed, edited, and agree with the note.   My findings are:Appears well.   Abdomen: soft, NT, ND.   Incision CDI   LE without significant edema or erythema  Hemoglobin   Date Value Ref Range Status   2018 8.7 (L) 11.7 - 15.7 g/dL Final   2018 10.7 (L) 11.7 - 15.7 g/dL Final       Gypsy Roberts MD

## 2018-05-09 ENCOUNTER — LACTATION ENCOUNTER (OUTPATIENT)
Age: 43
End: 2018-05-09

## 2018-05-09 NOTE — LACTATION NOTE
This note was copied from a baby's chart.  D: I met with mom for discharge teaching. She plans to primarily bottle feed her baby for the next couple of weeks while she works on her supply. She pumps when she can, knows to try to pump every 3 hours, and notices her supply is slowly increasing  I: I gave her a feeding log to use at home and went over the need for 8-12 feedings per day and how many wet diapers and stools she should see each day to show adequate intake. We discussed home storage of breast milk, weaning from the nipple shield and pumping, and transitioning to full breastfeeding at home.  I gave the mother handouts on all of these topics as well as extra nipple shields. We discussed growth spurts, birth control and other medications, paced bottlefeeding, Babyweigh rental scales, and resources for help at home/ when to seek outpatient help.  She verbalized understanding via teach back.   A: Mom has information and equipment she needs to feed her baby at home.   P: I encouraged her to call with any breastfeeding questions she may have in the future.

## 2018-06-16 ENCOUNTER — HEALTH MAINTENANCE LETTER (OUTPATIENT)
Age: 43
End: 2018-06-16

## (undated) DEVICE — STOCKING SLEEVE COMPRESSION CALF LG

## (undated) DEVICE — DRSG STERI STRIP 1/4X3" R1541

## (undated) DEVICE — SUCTION CANISTER MEDIVAC LINER 1500ML W/LID 65651-515

## (undated) DEVICE — SU MONOCRYL 0 CT-1 36" Y346H

## (undated) DEVICE — BASIN SET MAJOR

## (undated) DEVICE — STRAP KNEE/BODY 31143004

## (undated) DEVICE — SOL WATER IRRIG 1000ML BOTTLE 07139-09

## (undated) DEVICE — SU VICRYL 3-0 CTX 36" UND J980H

## (undated) DEVICE — SOL NACL 0.9% IRRIG 1000ML BOTTLE 07138-09

## (undated) DEVICE — CATH TRAY FOLEY 16FR SILICONE 907416

## (undated) DEVICE — GLOVE PROTEXIS BLUE W/NEU-THERA 7.0  2D73EB70

## (undated) DEVICE — SU VICRYL 0 CT-1 36" J346H

## (undated) DEVICE — PACK C-SECTION LF PL15OTA83B

## (undated) DEVICE — GLOVE ESTEEM POWDER FREE SMT 6.5  2D72PT65

## (undated) DEVICE — PREP CHLORAPREP 26ML TINTED ORANGE  260815

## (undated) DEVICE — SU MONOCRYL 4-0 PS-2 18" UND Y496G

## (undated) DEVICE — ESU GROUND PAD UNIVERSAL W/O CORD

## (undated) RX ORDER — LIDOCAINE HYDROCHLORIDE 20 MG/ML
INJECTION, SOLUTION EPIDURAL; INFILTRATION; INTRACAUDAL; PERINEURAL
Status: DISPENSED
Start: 2018-04-29

## (undated) RX ORDER — PROPOFOL 10 MG/ML
INJECTION, EMULSION INTRAVENOUS
Status: DISPENSED
Start: 2018-04-29

## (undated) RX ORDER — HYDROMORPHONE HYDROCHLORIDE 1 MG/ML
INJECTION, SOLUTION INTRAMUSCULAR; INTRAVENOUS; SUBCUTANEOUS
Status: DISPENSED
Start: 2018-04-29

## (undated) RX ORDER — FENTANYL CITRATE 50 UG/ML
INJECTION, SOLUTION INTRAMUSCULAR; INTRAVENOUS
Status: DISPENSED
Start: 2018-04-29

## (undated) RX ORDER — ONDANSETRON 2 MG/ML
INJECTION INTRAMUSCULAR; INTRAVENOUS
Status: DISPENSED
Start: 2018-04-29

## (undated) RX ORDER — PHENYLEPHRINE HCL IN 0.9% NACL 1 MG/10 ML
SYRINGE (ML) INTRAVENOUS
Status: DISPENSED
Start: 2018-04-29